# Patient Record
Sex: FEMALE | Race: WHITE
[De-identification: names, ages, dates, MRNs, and addresses within clinical notes are randomized per-mention and may not be internally consistent; named-entity substitution may affect disease eponyms.]

---

## 2018-08-13 ENCOUNTER — HOSPITAL ENCOUNTER (OUTPATIENT)
Dept: HOSPITAL 75 - PREOP | Age: 43
Discharge: HOME | End: 2018-08-13
Attending: OTOLARYNGOLOGY
Payer: MEDICAID

## 2018-08-13 VITALS — DIASTOLIC BLOOD PRESSURE: 75 MMHG | SYSTOLIC BLOOD PRESSURE: 137 MMHG

## 2018-08-13 VITALS — HEIGHT: 61 IN | WEIGHT: 239.13 LBS | BODY MASS INDEX: 45.15 KG/M2

## 2018-08-13 DIAGNOSIS — Z01.810: Primary | ICD-10-CM

## 2018-08-13 DIAGNOSIS — J33.9: ICD-10-CM

## 2018-08-13 DIAGNOSIS — J34.2: ICD-10-CM

## 2018-08-13 DIAGNOSIS — Z01.811: ICD-10-CM

## 2018-08-13 DIAGNOSIS — Z11.2: ICD-10-CM

## 2018-08-13 DIAGNOSIS — Z01.812: ICD-10-CM

## 2018-08-13 DIAGNOSIS — J34.3: ICD-10-CM

## 2018-08-13 DIAGNOSIS — J32.4: ICD-10-CM

## 2018-08-13 LAB
ALBUMIN SERPL-MCNC: 3.9 GM/DL (ref 3.2–4.5)
ALP SERPL-CCNC: 121 U/L (ref 40–136)
ALT SERPL-CCNC: 32 U/L (ref 0–55)
BASOPHILS # BLD AUTO: 0 10^3/UL (ref 0–0.1)
BASOPHILS NFR BLD AUTO: 1 % (ref 0–10)
BILIRUB SERPL-MCNC: 0.3 MG/DL (ref 0.1–1)
BUN/CREAT SERPL: 14
CALCIUM SERPL-MCNC: 9.8 MG/DL (ref 8.5–10.1)
CHLORIDE SERPL-SCNC: 99 MMOL/L (ref 98–107)
CO2 SERPL-SCNC: 15 MMOL/L (ref 21–32)
CREAT SERPL-MCNC: 0.69 MG/DL (ref 0.6–1.3)
EOSINOPHIL # BLD AUTO: 0.1 10^3/UL (ref 0–0.3)
EOSINOPHIL NFR BLD AUTO: 2 % (ref 0–10)
ERYTHROCYTE [DISTWIDTH] IN BLOOD BY AUTOMATED COUNT: 14 % (ref 10–14.5)
GFR SERPLBLD BASED ON 1.73 SQ M-ARVRAT: > 60 ML/MIN
GLUCOSE SERPL-MCNC: 319 MG/DL (ref 70–105)
HCT VFR BLD CALC: 42 % (ref 35–52)
HGB BLD-MCNC: 14 G/DL (ref 11.5–16)
LYMPHOCYTES # BLD AUTO: 2.8 X 10^3 (ref 1–4)
LYMPHOCYTES NFR BLD AUTO: 42 % (ref 12–44)
MANUAL DIFFERENTIAL PERFORMED BLD QL: NO
MCH RBC QN AUTO: 29 PG (ref 25–34)
MCHC RBC AUTO-ENTMCNC: 34 G/DL (ref 32–36)
MCV RBC AUTO: 86 FL (ref 80–99)
MONOCYTES # BLD AUTO: 0.4 X 10^3 (ref 0–1)
MONOCYTES NFR BLD AUTO: 5 % (ref 0–12)
NEUTROPHILS # BLD AUTO: 3.3 X 10^3 (ref 1.8–7.8)
NEUTROPHILS NFR BLD AUTO: 50 % (ref 42–75)
PLATELET # BLD: 235 10^3/UL (ref 130–400)
PMV BLD AUTO: 9.8 FL (ref 7.4–10.4)
POTASSIUM SERPL-SCNC: 4.3 MMOL/L (ref 3.6–5)
PROT SERPL-MCNC: 7.9 GM/DL (ref 6.4–8.2)
RBC # BLD AUTO: 4.84 10^6/UL (ref 4.35–5.85)
SODIUM SERPL-SCNC: 135 MMOL/L (ref 135–145)
WBC # BLD AUTO: 6.6 10^3/UL (ref 4.3–11)

## 2018-08-13 PROCEDURE — 93005 ELECTROCARDIOGRAM TRACING: CPT

## 2018-08-13 PROCEDURE — 87081 CULTURE SCREEN ONLY: CPT

## 2018-08-13 PROCEDURE — 85025 COMPLETE CBC W/AUTO DIFF WBC: CPT

## 2018-08-13 PROCEDURE — 36415 COLL VENOUS BLD VENIPUNCTURE: CPT

## 2018-08-13 PROCEDURE — 80053 COMPREHEN METABOLIC PANEL: CPT

## 2018-08-13 PROCEDURE — 71046 X-RAY EXAM CHEST 2 VIEWS: CPT

## 2018-08-13 NOTE — DIAGNOSTIC IMAGING REPORT
INDICATION: Preoperative evaluation prior to septoplasty.



TECHNIQUE: PA and lateral views of the chest are obtained.



COMPARISON: No previous study is available for comparison at this

time.



FINDINGS: Heart size and pulmonary vasculature are within normal

limits, and the lungs are clear, bilaterally.



IMPRESSION: Unremarkable chest.



Dictated by: 



  Dictated on workstation # [id number]

## 2018-08-16 ENCOUNTER — HOSPITAL ENCOUNTER (OUTPATIENT)
Dept: HOSPITAL 75 - SDC | Age: 43
Discharge: HOME | End: 2018-08-16
Attending: OTOLARYNGOLOGY
Payer: MEDICAID

## 2018-08-16 VITALS — BODY MASS INDEX: 45.15 KG/M2 | HEIGHT: 61 IN | WEIGHT: 239.13 LBS

## 2018-08-16 VITALS — SYSTOLIC BLOOD PRESSURE: 107 MMHG | DIASTOLIC BLOOD PRESSURE: 58 MMHG

## 2018-08-16 VITALS — DIASTOLIC BLOOD PRESSURE: 54 MMHG | SYSTOLIC BLOOD PRESSURE: 106 MMHG

## 2018-08-16 VITALS — SYSTOLIC BLOOD PRESSURE: 114 MMHG | DIASTOLIC BLOOD PRESSURE: 84 MMHG

## 2018-08-16 VITALS — DIASTOLIC BLOOD PRESSURE: 60 MMHG | SYSTOLIC BLOOD PRESSURE: 120 MMHG

## 2018-08-16 VITALS — DIASTOLIC BLOOD PRESSURE: 58 MMHG | SYSTOLIC BLOOD PRESSURE: 107 MMHG

## 2018-08-16 VITALS — SYSTOLIC BLOOD PRESSURE: 101 MMHG | DIASTOLIC BLOOD PRESSURE: 51 MMHG

## 2018-08-16 DIAGNOSIS — G43.909: ICD-10-CM

## 2018-08-16 DIAGNOSIS — E11.9: ICD-10-CM

## 2018-08-16 DIAGNOSIS — J32.0: Primary | ICD-10-CM

## 2018-08-16 DIAGNOSIS — J45.909: ICD-10-CM

## 2018-08-16 DIAGNOSIS — J33.0: ICD-10-CM

## 2018-08-16 DIAGNOSIS — J32.2: ICD-10-CM

## 2018-08-16 DIAGNOSIS — J32.3: ICD-10-CM

## 2018-08-16 DIAGNOSIS — K21.9: ICD-10-CM

## 2018-08-16 DIAGNOSIS — J34.2: ICD-10-CM

## 2018-08-16 DIAGNOSIS — Z79.4: ICD-10-CM

## 2018-08-16 DIAGNOSIS — Z87.891: ICD-10-CM

## 2018-08-16 DIAGNOSIS — E78.5: ICD-10-CM

## 2018-08-16 DIAGNOSIS — Z79.899: ICD-10-CM

## 2018-08-16 DIAGNOSIS — J34.3: ICD-10-CM

## 2018-08-16 PROCEDURE — 82962 GLUCOSE BLOOD TEST: CPT

## 2018-08-16 RX ADMIN — MORPHINE SULFATE PRN MG: 10 INJECTION, SOLUTION INTRAMUSCULAR; INTRAVENOUS at 10:08

## 2018-08-16 RX ADMIN — MORPHINE SULFATE PRN MG: 10 INJECTION, SOLUTION INTRAMUSCULAR; INTRAVENOUS at 10:15

## 2018-08-16 RX ADMIN — SODIUM CHLORIDE, SODIUM LACTATE, POTASSIUM CHLORIDE, AND CALCIUM CHLORIDE PRN MLS/HR: 600; 310; 30; 20 INJECTION, SOLUTION INTRAVENOUS at 10:30

## 2018-08-16 RX ADMIN — SODIUM CHLORIDE, SODIUM LACTATE, POTASSIUM CHLORIDE, AND CALCIUM CHLORIDE PRN MLS/HR: 600; 310; 30; 20 INJECTION, SOLUTION INTRAVENOUS at 08:14

## 2018-08-16 NOTE — PROGRESS NOTE-POST OPERATIVE
Post-Operative Progess Note


Surgeon (s)/Assistant (s)


Surgeon


KADY TREVIZO MD


Assistant


n/a





Pre-Operative Diagnosis


Bilat Chronic Sinusitis, Deviated nasal septum, Bilat Hyepr of Inf Turbs





Post-Operative Diagnosis


same





Post-Op Procedure Note


Date of Procedure:  Aug 16, 2018


Name of Procedure Performed:  


Bilat ESS, Bilat Reduction of INf Turbinates


Description & Findings


Description and Findings:





n/a


Anesthesia Type


get


Estimated Blood Loss


minimal


Packing


none.


Specimen(s) collected/removed


bilat chroinc sinus disease











KADY TREVIZO MD Aug 16, 2018 9:47 am

## 2018-08-16 NOTE — ANESTHESIA-GENERAL POST-OP
General


Patient Condition


Mental Status/LOC:  Same as Preop


Cardiovascular:  Satisfactory


Nausea/Vomiting:  Absent


Respiratory:  Satisfactory


Pain:  Controlled


Complications:  Absent





Post Op Complications


Complications


None





Follow Up Care/Instructions


Patient Instructions


None needed.





Anesthesia/Patient Condition


Patient Condition


Patient is doing well, no complaints, stable vital signs, no apparent adverse 

anesthesia problems.   


No complications reported per nursing.


D/C home per [facility] Criteria:  Yes











PENNIE CHRISTIE CRNA Aug 16, 2018 12:21

## 2018-08-16 NOTE — PROGRESS NOTE-PRE OPERATIVE
Pre-Operative Progress Note


H&P Reviewed


The H&P was reviewed, patient examined and no changes noted.


Date Seen by Provider:  Aug 16, 2018


Time Seen by Provider:  08:00


Date H&P Reviewed:  Aug 16, 2018


Time H&P Reviewed:  08:00


Pre-Operative Diagnosis:  Bilat Chronic Sinusitis, Deviated nasal septum, Bilat 

Hyepr of Inf Turbs











KADY TREVIZO MD Aug 16, 2018 8:21 am

## 2018-08-17 NOTE — XMS REPORT
[facility] Clinical Summary

 Created on: 2016



DanaKimberly

External Reference #: [id number]

: 1975

Sex: Female



Demographics







 Address  [address] [place]  [address]

 

 Home Phone  [phone]

 

 Preferred Language  English

 

 Marital Status  S

 

 Sabianist Affiliation  Unknown

 

 Race  Unknown

 

 Ethnic Group  Not  or 





Author







 Author  Admin, E

 

 Organization  [facility]

 

 Address  Unknown

 

 Phone  Unavailable







Allergies, Adverse Reactions, Alerts







 Allergy Name  Reaction Description  Start Date  Severity  Status  Provider

 

 No Known Allergies             DEMOND Kyle 







Conditions or Problems







 Problem Name  Problem Code  Onset Date  Status  Entry Date  Provider  Comment  
Standard Description  Annotate

 

 Asthma  493.90    Active    Jero Luz MD     Asthma, 
unspecified   (History of)

 

 Diabetes, Type 2  250.00    Active    Jero Luz MD   
  Diabetes mellitus without mention of complication, type II or unspecified type
, not stated as uncontrolled   

 

 FH Stroke  V17.1    Active    Jero Luz MD     Family 
history of stroke (cerebrovascular)   

 

 FH Diabetes  V18.0    Active    Jero Luz MD     
Family history of diabetes mellitus   

 

 Chronic tonsillitis  474.00    Active    Jero Luz MD
     Chronic tonsillitis   

 

 AFTERCARE FLW SURG TEETH ORL CAV&DIGESTV SYS NEC  V58.75    Active  
  Jero Luz MD     Aftercare following surgery of the teeth,oral 
cavity and digestive system, NEC   

 

 Irregular menses  626.4    Active    Stephanie Dove MD     
Irregular menstrual cycle   

 

 Stress incontinence  788.32    Active    Stephanie Dove MD 
    Stress incontinence, male   

 

 Hx of endometrial ablation  V45.89    Active    Stephanie Dove MD     Other postsurgical status   

 

 Family history of uterine cancer  V16.49    Active    Stephanie Dove MD     Family history of malignant neoplasm of other genital organ   







Medication List







 Medication  Instructions  Start Date  Stop Date  Generic Name  NDC  Status  
Provider  Patient Instruction









 SAVELLA 100 MG ORAL TABS  1 tablet twice daily       MILNACIPRAN HCL
  [id number]  Active  Stephanie Dove MD     Active

 

 AMITRIPTYLINE HCL 25 MG ORAL TABS  1.5 tablets once daily       
AMITRIPTYLINE HCL  [id number]  Active  Stephanie Dove MD     Active

 

 TIZANIDINE HCL 4 MG ORAL TABS  1/2 tablet 2 to 3x daily       
TIZANIDINE HCL  43552625033  Active  Stephanie Dove MD     Active

 

 PANTOPRAZOLE SODIUM 40 MG ORAL TBEC  1 tablet daily       
PANTOPRAZOLE SODIUM  97953997989  Active  Stephanie Dove MD     Active

 

 RANITIDINE  MG ORAL TABS  1 tablet twice daily       
RANITIDINE HCL  68015643165  Active  Stephanie Dove MD     Active

 

 CELECOXIB 200 MG ORAL CAPS  1 tablet twice daily       CELECOXIB  
63415408053  Active  Stephanie Dove MD     Active

 

 LEVO-T 25 MCG ORAL TABS  1 tablet daily       LEVOTHYROXINE SODIUM  
88309891543  Active  Stephanie Dove MD     Active

 

 B-12 1000 MCG ORAL CAPS  1 capsule daily       CYANOCOBALAMIN  
87797224861  Active  Stephanie Dove MD     Active

 

 INVOKANA 300 MG ORAL TABS  take 1 tablet by mouth daily before the first meal 
of the day       CANAGLIFLOZIN  93097729702  Active  Stephanie Dove MD
     Active

 

 FENOFIBRATE 150 MG ORAL CAPS  1 tablet daily       FENOFIBRATE  
23060673988  Active  Stephanie Dove MD     Active

 

 GLYBURIDE MICRONIZED 3 MG TABS  Take one by mouth daily    GLYBURIDE MICRONIZED  72492905715  No Longer Active  Stephanie Dove MD     
Active

 

 ALPRAZOLAM TABS  Take one by mouth at bedtime as needed    ALPRAZOLAM TABS  18212965141  No Longer Active  Stephanie Dove MD     Active

 

 ALBUTEROL SULFATE 0.083 % NEBU SOLN  one vial per nebulizer needed  
     ALBUTEROL SULFATE  26449023526  Active  Jero Luz MD     Active

 

 GABAPENTIN 300 MG CAPS  2 po q hs for nerve pain       GABAPENTIN  
54358502962  Active  Jero Luz MD     Active









 ALPRAZOLAM TABS  Take one by mouth at bedtime as needed    ALPRAZOLAM TABS     ALPRAZOLAM TABS  Inactive

 

 GLYBURIDE MICRONIZED 3 MG TABS  Take one by mouth daily    GLYBURIDE MICRONIZED 3 MG TABS  036964  GLYBURIDE MICRONIZED  Inactive







Diagnostic Results







 Date  Name  Value  Unit  Range  Description

 

 Chart Maintenance: Outside labs entered on flowsheet - Chemistry 

 

   sodium, serum  133  mmol/L      

 

   potassium, serum  3.8  mmol/L      

 

   blood glucose  275  mg/dL      

 

   creatinine, serum  0.64  mg/dL      

 

   aspartate aminotransferase (SGOT), serum  34  U/L      

 

   alanine aminotransferase (SGPT), serum  53  U/L      

 

   alkaline phosphatase, serum  129  U/L      

 

   protein, total urine random  2+  mg/dL      

 

 Chart Maintenance: Outside labs entered on flowsheet - Hematology 

 

   leukocyte count, blood  10.7  10*3/mm3      

 

   hemoglobin, blood  15.9  g/dL      

 

   platelet count  308  10*3/mm3      

 

 Lab Report: Chlamydia/GC APTIMA/62417 - Lab 

 

   chlamydia DNA probe  NOT DETECTED     NOT DETECTED   

 

 Lab Report: Chlamydia/GC APTIMA/86106 - Microbiology 

 

   Neisseria gonorrhoeae DNA probe  NOT DETECTED     NOT DETECTED   







Encounters







 Code  Encounter  Date  Provider  Facility

 

 CPT-11839  Level 3 Est. Patient   15:22:48 CST  Jero Luz MD  
[facility]







Procedures







 Code  Procedure Name  Date  Entry Date  Standard Description

 

 CPT-74416  Endometrial bx wo cervical dil   15:31:33 CDT  
   

 

 CPT-OV  Office Visit   14:46:14 CDT     

 

 CPT-18436  Postop F/U Visit   06:18:39 CST

## 2018-08-17 NOTE — XMS REPORT
[facility] Clinical Summary

 Created on: 2016



Dana Nicogaurang TIAN

External Reference #: [id number]

: 1975

Sex: Female



Demographics







 Address  [address] [place]  [address]

 

 Home Phone  [phone]

 

 Preferred Language  English

 

 Marital Status  S

 

 Taoism Affiliation  Unknown

 

 Race  Unknown

 

 Ethnic Group  Not  or 





Author







 Author  Admin, E

 

 Organization  [facility]

 

 Address  Unknown

 

 Phone  Unavailable







Allergies, Adverse Reactions, Alerts







 Allergy Name  Reaction Description  Start Date  Severity  Status  Provider

 

 No Known Allergies             Ching Vee







Conditions or Problems







 Problem Name  Problem Code  Onset Date  Status  Entry Date  Provider  Comment  
Standard Description  Annotate

 

 Asthma  493.90    Active    Jero Luz MD     Asthma, 
unspecified   (History of)

 

 Diabetes, Type 2  250.00    Active    Jero Luz MD   
  Diabetes mellitus without mention of complication, type II or unspecified type
, not stated as uncontrolled   

 

 FH Stroke  V17.1    Active    Jero Luz MD     Family 
history of stroke (cerebrovascular)   

 

 FH Diabetes  V18.0    Active    Jero Luz MD     
Family history of diabetes mellitus   

 

 Chronic tonsillitis  474.00    Active    Jero Luz MD
     Chronic tonsillitis   

 

 AFTERCARE FLW SURG TEETH ORL CAV&DIGESTV SYS NEC  V58.75    Active  
  Jero Luz MD     Aftercare following surgery of the teeth,oral 
cavity and digestive system, NEC   

 

 Irregular menses  626.4    Active    Stephanie Dove MD     
Irregular menstrual cycle   

 

 Stress incontinence  788.32    Active    Stephanie Dove MD 
    Stress incontinence, male   

 

 Hx of endometrial ablation  V45.89    Active    Stephanie Dove MD     Other postsurgical status   

 

 Family history of uterine cancer  V16.49    Active    Stephanie Dove MD     Family history of malignant neoplasm of other genital organ   







Medication List







 Medication  Instructions  Start Date  Stop Date  Generic Name  NDC  Status  
Provider  Patient Instruction









 SAVELLA 100 MG ORAL TABS  1 tablet twice daily       MILNACIPRAN HCL
  [id number]  Active  Stephanie Dove MD     Active

 

 AMITRIPTYLINE HCL 25 MG ORAL TABS  1.5 tablets once daily       
AMITRIPTYLINE HCL  16780628185  Active  Stephanie Dove MD     Active

 

 TIZANIDINE HCL 4 MG ORAL TABS  1/2 tablet 2 to 3x daily       
TIZANIDINE HCL  84364583195  Active  Stephanie Dove MD     Active

 

 PANTOPRAZOLE SODIUM 40 MG ORAL TBEC  1 tablet daily       
PANTOPRAZOLE SODIUM  19411346274  Active  Stephanie Dove MD     Active

 

 RANITIDINE  MG ORAL TABS  1 tablet twice daily       
RANITIDINE HCL  28131095773  Active  Stephanie Dove MD     Active

 

 CELECOXIB 200 MG ORAL CAPS  1 tablet twice daily       CELECOXIB  
49294768325  Active  Stephanie Dove MD     Active

 

 LEVO-T 25 MCG ORAL TABS  1 tablet daily       LEVOTHYROXINE SODIUM  
29381784857  Active  Stephanie Dove MD     Active

 

 B-12 1000 MCG ORAL CAPS  1 capsule daily       CYANOCOBALAMIN  
97036229865  Active  Stephanie Dove MD     Active

 

 INVOKANA 300 MG ORAL TABS  take 1 tablet by mouth daily before the first meal 
of the day       CANAGLIFLOZIN  36645602456  Active  Stephanie Dove MD
     Active

 

 FENOFIBRATE 150 MG ORAL CAPS  1 tablet daily       FENOFIBRATE  
79706318228  Active  Stephanie Dove MD     Active

 

 GLYBURIDE MICRONIZED 3 MG TABS  Take one by mouth daily    GLYBURIDE MICRONIZED  79771594695  No Longer Active  Stephanie Dove MD     
Active

 

 ALPRAZOLAM TABS  Take one by mouth at bedtime as needed    ALPRAZOLAM TABS  34681047819  No Longer Active  Stephanie Dove MD     Active

 

 ALBUTEROL SULFATE 0.083 % NEBU SOLN  one vial per nebulizer needed  
     ALBUTEROL SULFATE  23113424095  Active  Jero Luz MD     Active

 

 GABAPENTIN 300 MG CAPS  2 po q hs for nerve pain       GABAPENTIN  
82935594493  Active  Jero Luz MD     Active









 ALPRAZOLAM TABS  Take one by mouth at bedtime as needed    ALPRAZOLAM TABS     ALPRAZOLAM TABS  Inactive

 

 GLYBURIDE MICRONIZED 3 MG TABS  Take one by mouth daily    GLYBURIDE MICRONIZED 3 MG TABS  117961  GLYBURIDE MICRONIZED  Inactive







Vital Signs







 Date  Name  Value  Unit  Range  Description

 

   blood pressure, diastolic - 8462-4  80  mm[Hg]     BP graham

 

   blood pressure, systolic - 8480-6  133  mm[Hg]     BP sys

 

   pulse rate E&M - 8867-4  104  /min     Heart rate

 

   temperature E&M  98.0  [degF]     Body temperature

 

   weight E&M - 3141-9  223  [lb_av]     Weight Measured







Diagnostic Results







 Date  Name  Value  Unit  Range  Description

 

 Chart Maintenance: Outside labs entered on flowsheet - Chemistry 

 

   sodium, serum  133  mmol/L      

 

   potassium, serum  3.8  mmol/L      

 

   blood glucose  275  mg/dL      

 

   creatinine, serum  0.64  mg/dL      

 

   aspartate aminotransferase (SGOT), serum  34  U/L      

 

   alanine aminotransferase (SGPT), serum  53  U/L      

 

   alkaline phosphatase, serum  129  U/L      

 

   protein, total urine random  2+  mg/dL      

 

 Chart Maintenance: Outside labs entered on flowsheet - Hematology 

 

   leukocyte count, blood  10.7  10*3/mm3      

 

   hemoglobin, blood  15.9  g/dL      

 

   platelet count  308  10*3/mm3      

 

 Lab Report: Chlamydia/GC APTIMA/91071 - Lab 

 

   chlamydia DNA probe  NOT DETECTED     NOT DETECTED   

 

 Lab Report: Chlamydia/GC APTIMA/47043 - Microbiology 

 

   Neisseria gonorrhoeae DNA probe  NOT DETECTED     NOT DETECTED   







Encounters







 Code  Encounter  Date  Provider  Facility

 

 CPT-83576  Level 3 Est. Patient   15:22:48 CST  Jero Luz MD  
[facility]







Procedures







 Code  Procedure Name  Date  Entry Date  Standard Description

 

 CPT-13836  Endometrial bx wo cervical dil   15:31:33 CDT  
   

 

 CPT-OV  Office Visit   14:46:14 CDT     

 

 CPT-54269  Postop F/U Visit   06:18:39 CST

## 2018-08-17 NOTE — XMS REPORT
Continuity of Care Document

 Created on: 2018



LONG ROMO

External Reference #: [id number]

: 1975

Sex: Female



Demographics







 Home Phone  [phone] x

 

 Preferred Language  Unknown

 

 Marital Status  Unknown

 

 Tenriism Affiliation  Unknown

 

 Race  Unknown

 

 Ethnic Group  Unknown





Author







 Author  [facility]

 

 Organization  [facility]

 

 Address  Unknown

 

 Phone  Unavailable



              



Allergies

      





 Active            Description            Code            Type            
Severity            Reaction            Onset            Reported/Identified   
         Relationship to Patient            Clinical Status        

 

 Yes            Anectine            73213            Drug Allergy            
Unknown            Family hx of severe rx to Anecti                            
                                

 

 Yes            No Known Drug Allergies            46097180            Drug 
Allergy            N/A            N/A                                          
         Confirmed but inactive        

 

 Yes            succinylcholine            5590            Drug Allergy        
    N/A            N/A                                                   
Confirmed or Verified        



                      



Medications

      



There is no data.                  



Problems

      





 Date Dx Coded            Attending            Type            Code            
Diagnosis            Diagnosed By        

 

 2014            WIN BLOOD MD                         724.2         
   LUMBAGO/ LOW BACK PAIN                     



                  



Procedures

      





 Code            Description            Performed By            Performed On   
     

 

             79463                                  URINE DRUG SCREEN  (IN-HOUSE
)                                   2014        

 

             10373                                  ROUTINE VENIPUNCTURE       
                            2016        

 

             81832                                  COMPREHEN METABOLIC PANEL  
                                 2016        

 

             98234                                  URINALYSIS, AUTO W/SCOPE   
                                2016        

 

             94528                                  COMPLETE CBC, AUTOMATED    
                               2016        

 

             98472                                  RBC ANTIBODY SCREEN        
                           2016        

 

             05777                                  BLOOD TYPING, ABO          
                         2016        

 

             69943                                  BLOOD TYPING, RH (D)       
                            2016        

 

             04653                                  SPECIAL SUPPLIES           
                        2016        

 

                                               INJ, BUPIVICAINE LIPOSOME  
                                 2016        

 

                                               INJ, PROPOFOL, 10 MG       
                            2016        



                                      



Results

      





 Test            Result            Range        









 UA - 16 00:00         









 PH            6.5             4.5-8.0        

 

 SG            1.025             1.003-1.035        

 

 UABILI            NEGATIVE                      

 

 UABLD            NEGATIVE                      

 

 UACOLOR            YEL                      

 

 UAGLU            3+                      

 

 UAKET            NEGATIVE                      

 

 UALEUK            NEGATIVE                      

 

 UANIT            NEGATIVE                      

 

 UAURO            0.2             0-0.2        

 

 UCX            NO                      

 

 CLARITY            SLTCLDY                      

 

 PROTEIN            2+                      

 

 UA WBC            NOWBC                      

 

 UA RBC            R05                      

 

 SQUAMOUS EPITHELIAL CELLS            3+                      

 

 BACTERIA            OCC                      

 

 YEAST            3+                      









 CBC - 16 00:00         









 HCT            47.9 %            36.9-47.0        

 

 HGB            15.9 G/DL            12.0-16.0        

 

 MCH            29.9 PG            27-31        

 

 MCHC            33.2 G/DL            33-37        

 

 MCV            90.2 FL            81-99        

 

 MPV            9.6 FL            7.3-10.4        

 

 PLT            308 10^3u            130-400        

 

 RBC            5.3 10^6u            4.2-5.4        

 

 RDW            13.3 %            11.5-15.5        

 

 WBC            10.7 10^3u            4.8-10.8        









 CMP - 16 00:00         









 ALB            4.3 G/DL            3.5-5        

 

 ALP            129 IU/L            25-72        

 

 ALT            53 IU/L            12-65        

 

 AST            34 IU/L            10-42        

 

 BCR            15.6             10-20        

 

 BUN            10 MG/DL            7-18        

 

 CA            8.9 MG/DL            8.4-10.2        

 

 CL            95 MEQ/L                    

 

 CO2            27.4 MEQ/L            22-28        

 

 CREA            0.64 MG/DL            0.6-1.0        

 

 EGFR            104 eGFR            >=60        

 

 GLU            275 MG/DL                    

 

 K            3.8 MEQ/L            3.5-5.1        

 

 NA            133 MEQ/L            134-145        

 

 OSMSC            275.2 MOSML            280-300        

 

 TBIL            0.3 MG/DL            0.1-1.0        

 

 TP            7.7 G/DL            6.0-8.3        

 

 Albumin/Globulin Ratio            1.3             0-8        

 

 Anion Gap            10.6             8-16        









 TYPE AND SCREEN - 16 00:00         









 ABO            O                      

 

 ABSCRN            N             Negative        

 

 RH            P                      









 UCG GROUP - 16 00:00         









 UCG            N             Negative        









 HA1C - 16 00:00         









 HA1C            11.1 %            4.5-6.2        









 TSH - 16 00:00         









 TSH            1.88 UIUML            0.36-3.74        









 MICROALBUMIN - 16 00:00         









 MALB            758.9 MG/L                     









 [facility] - 16 00:00         









 ALB            3.2 G/DL            3.5-5        

 

 ALP            108 IU/L            25-72        

 

 ALT            47 IU/L            12-65        

 

 AST            22 IU/L            10-42        

 

 BCR            20.3             10-20        

 

 BUN            13 MG/DL            7-18        

 

 CA            8.6 MG/DL            8.4-10.2        

 

 CL            100 MEQ/L                    

 

 CO2            28.2 MEQ/L            22-28        

 

 CREA            0.64 MG/DL            0.6-1.0        

 

 EGFR            104 eGFR            >=60        

 

 GLU            289 MG/DL                    

 

 K            4.3 MEQ/L            3.5-5.1        

 

 NA            137 MEQ/L            134-145        

 

 OSMSC            284.5 MOSML            280-300        

 

 TBIL            0.2 MG/DL            0.1-1.0        

 

 TP            6.2 G/DL            6.0-8.3        

 

 Albumin/Globulin Ratio            1.1             0-8        

 

 Anion Gap            8.8             8-16        









 CBC - 16 00:00         









 HCT            40.2 %            36.9-47.0        

 

 HGB            12.9 G/DL            12.0-16.0        

 

 MCH            29.5 PG            27-31        

 

 MCHC            32.1 G/DL            33-37        

 

 MCV            91.8 FL            81-99        

 

 MPV            9.7 FL            7.3-10.4        

 

 PLT            273 10^3u            130-400        

 

 RBC            4.4 10^6u            4.2-5.4        

 

 RDW            12.8 %            11.5-15.5        

 

 WBC            12.1 10^3u            4.8-10.8        



                                  



Encounters

      





 ACCT No.            Visit Date/Time            Discharge            Status    
        Pt. Type            Provider            Facility            Loc./Unit  
          Complaint        

 

 [id number]            2016 06:29:00            2016 12:45:00         
   DIS            Outpatient            ORAL OTERO            [facility]            OBS                     

 

 [id number]            2015 00:00:00                                   
   Document Registration                                                       
     

 

 [id number]            2018 10:15:03                         [facility]            
Unknown                                                            

 

 [id number]            2014 14:50:00            2014 23:59:59          
  CLS            Outpatient            SINGER GANNON, WIN TIAN

## 2018-08-17 NOTE — XMS REPORT
[facility] Clinical Summary

 Created on: 2016



Dana Nicogaurang TIAN

External Reference #: [id number]

: 1975

Sex: Female



Demographics







 Address  [address] [place]  [address]

 

 Home Phone  [phone]

 

 Preferred Language  English

 

 Marital Status  S

 

 Sikh Affiliation  Unknown

 

 Race  Unknown

 

 Ethnic Group  Not  or 





Author







 Author  Admin, E

 

 Organization  [facility]

 

 Address  Unknown

 

 Phone  Unavailable







Allergies, Adverse Reactions, Alerts







 Allergy Name  Reaction Description  Start Date  Severity  Status  Provider

 

 No Known Allergies             Ching Vee







Conditions or Problems







 Problem Name  Problem Code  Onset Date  Status  Entry Date  Provider  Comment  
Standard Description  Annotate

 

 Asthma  493.90    Active    Jero Luz MD     Asthma, 
unspecified   (History of)

 

 Diabetes, Type 2  250.00    Active    Jero Luz MD   
  Diabetes mellitus without mention of complication, type II or unspecified type
, not stated as uncontrolled   

 

 FH Stroke  V17.1    Active    Jero Luz MD     Family 
history of stroke (cerebrovascular)   

 

 FH Diabetes  V18.0    Active    Jero Luz MD     
Family history of diabetes mellitus   

 

 Chronic tonsillitis  474.00    Active    Jero Luz MD
     Chronic tonsillitis   

 

 AFTERCARE FLW SURG TEETH ORL CAV&DIGESTV SYS NEC  V58.75    Active  
  Jero Luz MD     Aftercare following surgery of the teeth,oral 
cavity and digestive system, NEC   

 

 Irregular menses  626.4    Resolved    Stephanie Dove MD   
  Irregular menstrual cycle   

 

 Stress incontinence  788.32    Active    Stephanie Dove MD 
    Stress incontinence, male   

 

 Hx of endometrial ablation  V45.89    Resolved    Stephanie Dove MD     Other postsurgical status   

 

 Family history of uterine cancer  V16.49    Active    Stephanie Dove MD     Family history of malignant neoplasm of other genital organ   

 

 Post-op care  V67.00    Active    Stephanie Dove MD     
Follow-up examination following surgery, unspecified   









 Irregular menses  ICD-626.4    Inactive  Stephanie Dove MD     

 

 Hx of endometrial ablation  ICD-V45.89    Inactive  Stephanie Dove MD     







Medication List







 Medication  Instructions  Start Date  Stop Date  Generic Name  NDC  Status  
Provider  Patient Instruction









 LANTUS 100 UNIT/ML SC SOLN  15units at night       INSULIN GLARGINE  
02374261433  Active  Stephanie Dove MD     Active

 

 NOVOLOG 100 UNIT/ML SC SOLN  sliding scale at meals       INSULIN 
ASPART  02357570599  Active  Stephanie Dove MD     Active

 

 GABAPENTIN 300 MG CAPS  2 po q hs for nerve pain    
GABAPENTIN  92715686196  No Longer Active  Stephanie Dove MD     Active

 

 INVOKANA 300 MG ORAL TABS  take 1 tablet by mouth daily before the first meal 
of the day    CANAGLIFLOZIN  70762133630  No Longer 
Active  Stephanie Dove MD     Active

 

 SAVELLA 100 MG ORAL TABS  1 tablet twice daily       MILNACIPRAN HCL
  21630624544  Active  Stephanie Dove MD     Active

 

 AMITRIPTYLINE HCL 25 MG ORAL TABS  1.5 tablets once daily       
AMITRIPTYLINE HCL  20824086702  Active  Stephanie Dove MD     Active

 

 TIZANIDINE HCL 4 MG ORAL TABS  1/2 tablet 2 to 3x daily       
TIZANIDINE HCL  61499038201  Active  Stephanie Dove MD     Active

 

 PANTOPRAZOLE SODIUM 40 MG ORAL TBEC  1 tablet daily       
PANTOPRAZOLE SODIUM  64637446219  Active  Stephanie Dove MD     Active

 

 RANITIDINE  MG ORAL TABS  1 tablet twice daily       
RANITIDINE HCL  26054906904  Active  Stephanie Dove MD     Active

 

 CELECOXIB 200 MG ORAL CAPS  1 tablet twice daily       CELECOXIB  
60560526703  Active  Stephanie Dove MD     Active

 

 LEVO-T 25 MCG ORAL TABS  1 tablet daily       LEVOTHYROXINE SODIUM  
19994449909  Active  Stephanie Dove MD     Active

 

 B-12 1000 MCG ORAL CAPS  1 capsule daily       CYANOCOBALAMIN  
28357119986  Active  Stephanie Dove MD     Active

 

 FENOFIBRATE 150 MG ORAL CAPS  1 tablet daily       FENOFIBRATE  
82381375788  Active  Stephanie Dove MD     Active

 

 GLYBURIDE MICRONIZED 3 MG TABS  Take one by mouth daily    GLYBURIDE MICRONIZED  84939525316  No Longer Active  Stephanie Dove MD     
Active

 

 ALPRAZOLAM TABS  Take one by mouth at bedtime as needed    ALPRAZOLAM TABS  25979477270  No Longer Active  Stephanie Dove MD     Active

 

 ALBUTEROL SULFATE 0.083 % NEBU SOLN  one vial per nebulizer needed  
     ALBUTEROL SULFATE  74582610332  Active  Jero Luz MD     Active









 ALPRAZOLAM TABS  Take one by mouth at bedtime as needed    ALPRAZOLAM TABS     ALPRAZOLAM TABS  Inactive

 

 GLYBURIDE MICRONIZED 3 MG TABS  Take one by mouth daily    GLYBURIDE MICRONIZED 3 MG TABS  152899  GLYBURIDE MICRONIZED  Inactive

 

 INVOKANA 300 MG ORAL TABS  take 1 tablet by mouth daily before the first meal 
of the day    INVOKANA 300 MG ORAL TABS     CANAGLIFLOZIN
  Inactive

 

 GABAPENTIN 300 MG CAPS  2 po q hs for nerve pain    
GABAPENTIN 300 MG CAPS  602775  GABAPENTIN  Inactive







Vital Signs







 Date  Name  Value  Unit  Range  Description

 

   blood pressure, diastolic - 8462-4  70  mm[Hg]     BP graham

 

   blood pressure, systolic - 8480-6  134  mm[Hg]     BP sys

 

   pulse rate E&M - 8867-4  95  /min     Heart rate

 

   temperature E&M  98.0  [degF]     Body temperature

 

   weight E&M - 3141-9  218  [lb_av]     Weight Measured

 

   blood pressure, diastolic - 8462-4  80  mm[Hg]     BP graham

 

   blood pressure, systolic - 8480-6  133  mm[Hg]     BP sys

 

   pulse rate E&M - 8867-4  104  /min     Heart rate

 

   temperature E&M  98.0  [degF]     Body temperature

 

   weight E&M - 3141-9  223  [lb_av]     Weight Measured







Diagnostic Results







 Date  Name  Value  Unit  Range  Description

 

 Chart Maintenance: Outside labs entered on flowsheet - Chemistry 

 

   sodium, serum  133  mmol/L      

 

   potassium, serum  3.8  mmol/L      

 

   blood glucose  275  mg/dL      

 

   creatinine, serum  0.64  mg/dL      

 

   aspartate aminotransferase (SGOT), serum  34  U/L      

 

   alanine aminotransferase (SGPT), serum  53  U/L      

 

   alkaline phosphatase, serum  129  U/L      

 

   protein, total urine random  2+  mg/dL      

 

 Chart Maintenance: Outside labs entered on flowsheet - Hematology 

 

   leukocyte count, blood  10.7  10*3/mm3      

 

   hemoglobin, blood  15.9  g/dL      

 

   platelet count  308  10*3/mm3      

 

 Lab Report: Chlamydia/GC APTIMA/91429 - Lab 

 

   chlamydia DNA probe  NOT DETECTED     NOT DETECTED   

 

 Lab Report: Chlamydia/GC APTIMA/25526 - Microbiology 

 

   Neisseria gonorrhoeae DNA probe  NOT DETECTED     NOT DETECTED   







Encounters







 Code  Encounter  Date  Provider  Facility

 

 CPT-14251  Level 3 Est. Patient   15:22:48 CST  Jero Luz MD  
[facility]







Procedures







 Code  Procedure Name  Date  Entry Date  Standard Description

 

 CPT-OV  Office Visit   15:52:26 CDT     

 

 CPT-91768  Endometrial bx wo cervical dil   15:31:33 CDT  
   

 

 CPT-OV  Office Visit   14:46:14 CDT     

 

 CPT-48381  Postop F/U Visit   06:18:39 CST

## 2018-08-17 NOTE — XMS REPORT
[facility] [employer] Clinical Summary

 Created on: 2016



Dana Nicogaurang TIAN

External Reference #: [id number]

: 1975

Sex: Female



Demographics







 Address  [address] [place]  [address]

 

 Home Phone  [phone]

 

 Preferred Language  English

 

 Marital Status  S

 

 Mu-ism Affiliation  Unknown

 

 Race  Unknown

 

 Ethnic Group  Not  or 





Author







 Author  Admin, E

 

 Organization  [facility]

 

 Address  Unknown

 

 Phone  Unavailable







Allergies, Adverse Reactions, Alerts







 Allergy Name  Reaction Description  Start Date  Severity  Status  Provider

 

 No Known Allergies             Ching Vee







Conditions or Problems







 Problem Name  Problem Code  Onset Date  Status  Entry Date  Provider  Comment  
Standard Description  Annotate

 

 Asthma  493.90    Active    Jero Luz MD     Asthma, 
unspecified   (History of)

 

 Diabetes, Type 2  250.00    Active    Jero Luz MD   
  Diabetes mellitus without mention of complication, type II or unspecified type
, not stated as uncontrolled   

 

 FH Stroke  V17.1    Active    Jero Luz MD     Family 
history of stroke (cerebrovascular)   

 

 FH Diabetes  V18.0    Active    Jero Luz MD     
Family history of diabetes mellitus   

 

 Chronic tonsillitis  474.00    Active    Jero Luz MD
     Chronic tonsillitis   

 

 AFTERCARE FLW SURG TEETH ORL CAV&DIGESTV SYS NEC  V58.75    Active  
  Jero Luz MD     Aftercare following surgery of the teeth,oral 
cavity and digestive system, NEC   

 

 Irregular menses  626.4    Resolved    Stephanie Dove MD   
  Irregular menstrual cycle   

 

 Stress incontinence  788.32    Active    Stephanie Dove MD 
    Stress incontinence, male   

 

 Hx of endometrial ablation  V45.89    Resolved    Stephanie Dove MD     Other postsurgical status   

 

 Family history of uterine cancer  V16.49    Active    Stephanie Dove MD     Family history of malignant neoplasm of other genital organ   

 

 Post-op care  V67.00    Active    Stephanie Dove MD     
Follow-up examination following surgery, unspecified   









 Irregular menses  ICD-626.4    Inactive  Stephanie Dove MD     

 

 Hx of endometrial ablation  ICD-V45.89    Inactive  Stephanie Dove MD     







Medication List







 Medication  Instructions  Start Date  Stop Date  Generic Name  NDC  Status  
Provider  Patient Instruction









 ESTRADIOL 1 MG ORAL TABS  one tab PO daily       ESTRADIOL  
[id number]  Active  Stephanie Dove MD     Active

 

 LANTUS 100 UNIT/ML SC SOLN  15units at night       INSULIN GLARGINE  
38061191485  Active  Stephanie Dove MD     Active

 

 NOVOLOG 100 UNIT/ML SC SOLN  sliding scale at meals       INSULIN 
ASPART  72038401561  Active  Stephanie Dove MD     Active

 

 GABAPENTIN 300 MG CAPS  2 po q hs for nerve pain    
GABAPENTIN  17447326966  No Longer Active  Stephanie Dove MD     Active

 

 INVOKANA 300 MG ORAL TABS  take 1 tablet by mouth daily before the first meal 
of the day    CANAGLIFLOZIN  87925462820  No Longer 
Active  Stephanie Dove MD     Active

 

 SAVELLA 100 MG ORAL TABS  1 tablet twice daily       MILNACIPRAN HCL
  32283926098  Active  Stephanie Dove MD     Active

 

 AMITRIPTYLINE HCL 25 MG ORAL TABS  1.5 tablets once daily       
AMITRIPTYLINE HCL  32154038982  Active  Stephanie Dove MD     Active

 

 TIZANIDINE HCL 4 MG ORAL TABS  1/2 tablet 2 to 3x daily       
TIZANIDINE HCL  82954425113  Active  Stephanie Dove MD     Active

 

 PANTOPRAZOLE SODIUM 40 MG ORAL TBEC  1 tablet daily       
PANTOPRAZOLE SODIUM  36187000776  Active  Stephanie Dove MD     Active

 

 RANITIDINE  MG ORAL TABS  1 tablet twice daily       
RANITIDINE HCL  78542266961  Active  Stephanie Dove MD     Active

 

 CELECOXIB 200 MG ORAL CAPS  1 tablet twice daily       CELECOXIB  
60773315226  Active  Stephanie Dove MD     Active

 

 LEVO-T 25 MCG ORAL TABS  1 tablet daily       LEVOTHYROXINE SODIUM  
79693811598  Active  Stephanie Dove MD     Active

 

 B-12 1000 MCG ORAL CAPS  1 capsule daily       CYANOCOBALAMIN  
90866069520  Active  Stephanie Dove MD     Active

 

 FENOFIBRATE 150 MG ORAL CAPS  1 tablet daily       FENOFIBRATE  
28521770980  Active  Stephanie Dove MD     Active

 

 GLYBURIDE MICRONIZED 3 MG TABS  Take one by mouth daily    GLYBURIDE MICRONIZED  72271662371  No Longer Active  Stephanie Dove MD     
Active

 

 ALPRAZOLAM TABS  Take one by mouth at bedtime as needed    ALPRAZOLAM TABS  14816722591  No Longer Active  Stephanie Dove MD     Active

 

 ALBUTEROL SULFATE 0.083 % NEBU SOLN  one vial per nebulizer needed  
     ALBUTEROL SULFATE  59052834410  Active  Jero Luz MD     Active









 ALPRAZOLAM TABS  Take one by mouth at bedtime as needed    ALPRAZOLAM TABS     ALPRAZOLAM TABS  Inactive

 

 GLYBURIDE MICRONIZED 3 MG TABS  Take one by mouth daily    GLYBURIDE MICRONIZED 3 MG TABS  199584  GLYBURIDE MICRONIZED  Inactive

 

 INVOKANA 300 MG ORAL TABS  take 1 tablet by mouth daily before the first meal 
of the day    INVOKANA 300 MG ORAL TABS     CANAGLIFLOZIN
  Inactive

 

 GABAPENTIN 300 MG CAPS  2 po q hs for nerve pain    
GABAPENTIN 300 MG CAPS  172277  GABAPENTIN  Inactive







Vital Signs







 Date  Name  Value  Unit  Range  Description

 

   blood pressure, diastolic - 8462-4  70  mm[Hg]     BP graham

 

   blood pressure, systolic - 8480-6  134  mm[Hg]     BP sys

 

   pulse rate E&M - 8867-4  95  /min     Heart rate

 

   temperature E&M  98.0  [degF]     Body temperature

 

   weight E&M - 3141-9  218  [lb_av]     Weight Measured

 

   blood pressure, diastolic - 8462-4  80  mm[Hg]     BP graham

 

   blood pressure, systolic - 8480-6  133  mm[Hg]     BP sys

 

   pulse rate E&M - 8867-4  104  /min     Heart rate

 

   temperature E&M  98.0  [degF]     Body temperature

 

   weight E&M - 3141-9  223  [lb_av]     Weight Measured







Diagnostic Results







 Date  Name  Value  Unit  Range  Description

 

 Chart Maintenance: Outside labs entered on flowsheet - Chemistry 

 

   sodium, serum  133  mmol/L      

 

   potassium, serum  3.8  mmol/L      

 

   blood glucose  275  mg/dL      

 

   creatinine, serum  0.64  mg/dL      

 

   aspartate aminotransferase (SGOT), serum  34  U/L      

 

   alanine aminotransferase (SGPT), serum  53  U/L      

 

   alkaline phosphatase, serum  129  U/L      

 

   protein, total urine random  2+  mg/dL      

 

 Chart Maintenance: Outside labs entered on flowsheet - Hematology 

 

   leukocyte count, blood  10.7  10*3/mm3      

 

   hemoglobin, blood  15.9  g/dL      

 

   platelet count  308  10*3/mm3      

 

 Lab Report: Chlamydia/GC APTIMA/85961 - Lab 

 

   chlamydia DNA probe  NOT DETECTED     NOT DETECTED   

 

 Lab Report: Chlamydia/GC APTIMA/76928 - Microbiology 

 

   Neisseria gonorrhoeae DNA probe  NOT DETECTED     NOT DETECTED   







Encounters







 Code  Encounter  Date  Provider  Facility

 

 CPT-34687  Level 3 Est. Patient   15:22:48 CST  Jero Luz MD  
[facility]







Procedures







 Code  Procedure Name  Date  Entry Date  Standard Description

 

 CPT-OV  Office Visit   15:52:26 CDT     

 

 CPT-66910  Endometrial bx wo cervical dil   15:31:33 CDT  
   

 

 CPT-OV  Office Visit   14:46:14 CDT     

 

 CPT-68721  Postop F/U Visit   06:18:39 CST

## 2018-08-17 NOTE — XMS REPORT
[facility] [employer] Clinical Summary

 Created on: 2016



DanaKimberly

External Reference #: [id number]

: 1975

Sex: Female



Demographics







 Address  [address] [place]  [address]

 

 Home Phone  [phone]

 

 Preferred Language  English

 

 Marital Status  S

 

 Zoroastrian Affiliation  Unknown

 

 Race  Unknown

 

 Ethnic Group  Not  or 





Author







 Author  Admin, E

 

 Organization  [email or web address]

 

 Address  Unknown

 

 Phone  Unavailable







Allergies, Adverse Reactions, Alerts







 Allergy Name  Reaction Description  Start Date  Severity  Status  Provider

 

 No Known Allergies             Ching Vee







Conditions or Problems







 Problem Name  Problem Code  Onset Date  Status  Entry Date  Provider  Comment  
Standard Description  Annotate

 

 Asthma  493.90    Active    Jero Luz MD     Asthma, 
unspecified   (History of)

 

 Diabetes, Type 2  250.00    Active    Jero Luz MD   
  Diabetes mellitus without mention of complication, type II or unspecified type
, not stated as uncontrolled   

 

 FH Stroke  V17.1    Active    Jero Luz MD     Family 
history of stroke (cerebrovascular)   

 

 FH Diabetes  V18.0    Active    Jero Luz MD     
Family history of diabetes mellitus   

 

 Chronic tonsillitis  474.00    Active    Jero Luz MD
     Chronic tonsillitis   

 

 AFTERCARE FLW SURG TEETH ORL CAV&DIGESTV SYS NEC  V58.75    Active  
  Jero Luz MD     Aftercare following surgery of the teeth,oral 
cavity and digestive system, NEC   

 

 Irregular menses  626.4    Active    Stephanie Dove MD     
Irregular menstrual cycle   

 

 Stress incontinence  788.32    Active    Stephanie Dove MD 
    Stress incontinence, male   

 

 Hx of endometrial ablation  V45.89    Active    Stephanie Dove MD     Other postsurgical status   

 

 Family history of uterine cancer  V16.49    Active    Stephanie Dove MD     Family history of malignant neoplasm of other genital organ   







Medication List







 Medication  Instructions  Start Date  Stop Date  Generic Name  NDC  Status  
Provider  Patient Instruction









 SAVELLA 100 MG ORAL TABS  1 tablet twice daily       MILNACIPRAN HCL
  [id number]  Active  Stephanie Dove MD     Active

 

 AMITRIPTYLINE HCL 25 MG ORAL TABS  1.5 tablets once daily       
AMITRIPTYLINE HCL  86698868594  Active  Stephanie Dove MD     Active

 

 TIZANIDINE HCL 4 MG ORAL TABS  1/2 tablet 2 to 3x daily       
TIZANIDINE HCL  06170155609  Active  Stephanie Dove MD     Active

 

 PANTOPRAZOLE SODIUM 40 MG ORAL TBEC  1 tablet daily       
PANTOPRAZOLE SODIUM  99608725790  Active  Stephanie Dove MD     Active

 

 RANITIDINE  MG ORAL TABS  1 tablet twice daily       
RANITIDINE HCL  49308129826  Active  Stephanie Dove MD     Active

 

 CELECOXIB 200 MG ORAL CAPS  1 tablet twice daily       CELECOXIB  
92946345142  Active  Stephanie Dove MD     Active

 

 LEVO-T 25 MCG ORAL TABS  1 tablet daily       LEVOTHYROXINE SODIUM  
46745917409  Active  Stephanie Dove MD     Active

 

 B-12 1000 MCG ORAL CAPS  1 capsule daily       CYANOCOBALAMIN  
39635744318  Active  Stephanie Dove MD     Active

 

 INVOKANA 300 MG ORAL TABS  take 1 tablet by mouth daily before the first meal 
of the day       CANAGLIFLOZIN  81029496134  Active  Stephanie Dove MD
     Active

 

 FENOFIBRATE 150 MG ORAL CAPS  1 tablet daily       FENOFIBRATE  
93961378154  Active  Stephanie Dove MD     Active

 

 GLYBURIDE MICRONIZED 3 MG TABS  Take one by mouth daily    GLYBURIDE MICRONIZED  25125509704  No Longer Active  Stephanie Dove MD     
Active

 

 ALPRAZOLAM TABS  Take one by mouth at bedtime as needed    ALPRAZOLAM TABS  58148394999  No Longer Active  Stephanie Dove MD     Active

 

 ALBUTEROL SULFATE 0.083 % NEBU SOLN  one vial per nebulizer needed  
     ALBUTEROL SULFATE  69172404968  Active  Jero Luz MD     Active

 

 GABAPENTIN 300 MG CAPS  2 po q hs for nerve pain       GABAPENTIN  
27986583801  Active  Jero Luz MD     Active









 ALPRAZOLAM TABS  Take one by mouth at bedtime as needed    ALPRAZOLAM TABS     ALPRAZOLAM TABS  Inactive

 

 GLYBURIDE MICRONIZED 3 MG TABS  Take one by mouth daily    GLYBURIDE MICRONIZED 3 MG TABS  808709  GLYBURIDE MICRONIZED  Inactive







Vital Signs







 Date  Name  Value  Unit  Range  Description

 

   blood pressure, diastolic - 8462-4  80  mm[Hg]     BP graham

 

   blood pressure, systolic - 8480-6  133  mm[Hg]     BP sys

 

   pulse rate E&M - 8867-4  104  /min     Heart rate

 

   temperature E&M  98.0  [degF]     Body temperature

 

   weight E&M - 3141-9  223  [lb_av]     Weight Measured







Diagnostic Results







 Date  Name  Value  Unit  Range  Description

 

 Chart Maintenance: Outside labs entered on flowsheet - Chemistry 

 

   sodium, serum  133  mmol/L      

 

   potassium, serum  3.8  mmol/L      

 

   blood glucose  275  mg/dL      

 

   creatinine, serum  0.64  mg/dL      

 

   aspartate aminotransferase (SGOT), serum  34  U/L      

 

   alanine aminotransferase (SGPT), serum  53  U/L      

 

   alkaline phosphatase, serum  129  U/L      

 

   protein, total urine random  2+  mg/dL      

 

 Chart Maintenance: Outside labs entered on flowsheet - Hematology 

 

   leukocyte count, blood  10.7  10*3/mm3      

 

   hemoglobin, blood  15.9  g/dL      

 

   platelet count  308  10*3/mm3      

 

 Lab Report: Chlamydia/GC APTIMA/83013 - Lab 

 

   chlamydia DNA probe  NOT DETECTED     NOT DETECTED   

 

 Lab Report: Chlamydia/GC APTIMA/17318 - Microbiology 

 

   Neisseria gonorrhoeae DNA probe  NOT DETECTED     NOT DETECTED   







Encounters







 Code  Encounter  Date  Provider  Facility

 

 CPT-60399  Level 3 Est. Patient   15:22:48 CST  Jero Luz MD  
[facility]







Procedures







 Code  Procedure Name  Date  Entry Date  Standard Description

 

 CPT-66823  Endometrial bx wo cervical dil   15:31:33 CDT  
   

 

 CPT-OV  Office Visit   14:46:14 CDT     

 

 CPT-83247  Postop F/U Visit   06:18:39 CST

## 2018-08-17 NOTE — XMS REPORT
[facility] Clinical Summary

 Created on: 2016



DanaKimberly

External Reference #: [id number]

: 1975

Sex: Female



Demographics







 Address  [address] [place]  [address]

 

 Home Phone  [phone]

 

 Preferred Language  English

 

 Marital Status  S

 

 Caodaism Affiliation  Unknown

 

 Race  Unknown

 

 Ethnic Group  Not  or 





Author







 Author  Admin, E

 

 Organization  [facility] [email or web address]

 

 Address  Unknown

 

 Phone  Unavailable







Allergies, Adverse Reactions, Alerts







 Allergy Name  Reaction Description  Start Date  Severity  Status  Provider

 

 No Known Allergies             DEMOND Kyle 







Conditions or Problems







 Problem Name  Problem Code  Onset Date  Status  Entry Date  Provider  Comment  
Standard Description  Annotate

 

 Asthma  493.90    Active    Jero Luz MD     Asthma, 
unspecified   (History of)

 

 Diabetes, Type 2  250.00    Active    Jero Luz MD   
  Diabetes mellitus without mention of complication, type II or unspecified type
, not stated as uncontrolled   

 

 FH Stroke  V17.1    Active    Jero Luz MD     Family 
history of stroke (cerebrovascular)   

 

 FH Diabetes  V18.0    Active    Jero Luz MD     
Family history of diabetes mellitus   

 

 Chronic tonsillitis  474.00    Active    Jero Luz MD
     Chronic tonsillitis   

 

 AFTERCARE FLW SURG TEETH ORL CAV&DIGESTV SYS NEC  V58.75    Active  
  Jero Luz MD     Aftercare following surgery of the teeth,oral 
cavity and digestive system, NEC   

 

 Irregular menses  626.4    Active    Stephanie Dove MD     
Irregular menstrual cycle   

 

 Stress incontinence  788.32    Active    Stephanie Dove MD 
    Stress incontinence, male   

 

 Hx of endometrial ablation  V45.89    Active    Stephanie Dove MD     Other postsurgical status   

 

 Family history of uterine cancer  V16.49    Active    Stephanie Dove MD     Family history of malignant neoplasm of other genital organ   







Medication List







 Medication  Instructions  Start Date  Stop Date  Generic Name  NDC  Status  
Provider  Patient Instruction









 SAVELLA 100 MG ORAL TABS  1 tablet twice daily       MILNACIPRAN HCL
  [id number]  Active  Stephanie Dove MD     Active

 

 AMITRIPTYLINE HCL 25 MG ORAL TABS  1.5 tablets once daily       
AMITRIPTYLINE HCL  87204361236  Active  Stephanie Dove MD     Active

 

 TIZANIDINE HCL 4 MG ORAL TABS  1/2 tablet 2 to 3x daily       
TIZANIDINE HCL  67536025743  Active  Stephanie Dove MD     Active

 

 PANTOPRAZOLE SODIUM 40 MG ORAL TBEC  1 tablet daily       
PANTOPRAZOLE SODIUM  45991012579  Active  Stephanie Dove MD     Active

 

 RANITIDINE  MG ORAL TABS  1 tablet twice daily       
RANITIDINE HCL  60826894962  Active  Stephanie Dove MD     Active

 

 CELECOXIB 200 MG ORAL CAPS  1 tablet twice daily       CELECOXIB  
[id number]  Active  Stephanie Dove MD     Active

 

 LEVO-T 25 MCG ORAL TABS  1 tablet daily       LEVOTHYROXINE SODIUM  
02569179129  Active  Stephanie Dove MD     Active

 

 B-12 1000 MCG ORAL CAPS  1 capsule daily       CYANOCOBALAMIN  
37016644415  Active  Stephanie Dove MD     Active

 

 INVOKANA 300 MG ORAL TABS  take 1 tablet by mouth daily before the first meal 
of the day       CANAGLIFLOZIN  31894598934  Active  Stephanie Dove MD
     Active

 

 FENOFIBRATE 150 MG ORAL CAPS  1 tablet daily       FENOFIBRATE  
02966830604  Active  Stephanie Dove MD     Active

 

 GLYBURIDE MICRONIZED 3 MG TABS  Take one by mouth daily    GLYBURIDE MICRONIZED  15880541440  No Longer Active  Stephanie Dove MD     
Active

 

 ALPRAZOLAM TABS  Take one by mouth at bedtime as needed    ALPRAZOLAM TABS  50491074075  No Longer Active  Stephanie Dove MD     Active

 

 ALBUTEROL SULFATE 0.083 % NEBU SOLN  one vial per nebulizer needed  
     ALBUTEROL SULFATE  49528541701  Active  Jero Luz MD     Active

 

 GABAPENTIN 300 MG CAPS  2 po q hs for nerve pain       GABAPENTIN  
99908031400  Active  Jero Luz MD     Active









 ALPRAZOLAM TABS  Take one by mouth at bedtime as needed    ALPRAZOLAM TABS     ALPRAZOLAM TABS  Inactive

 

 GLYBURIDE MICRONIZED 3 MG TABS  Take one by mouth daily    GLYBURIDE MICRONIZED 3 MG TABS  [id number]  GLYBURIDE MICRONIZED  Inactive







Encounters







 Code  Encounter  Date  Provider  Facility

 

 CPT-43809  Level 3 Est. Patient   15:22:48 CST  Jero Luz MD  
[facility]







Procedures







 Code  Procedure Name  Date  Entry Date  Standard Description

 

 CPT-39933  Endometrial bx wo cervical dil   15:31:33 CDT  
   

 

 CPT-OV  Office Visit   14:46:14 CDT     

 

 CPT-03086  Postop F/U Visit   06:18:39 CST

## 2018-08-17 NOTE — XMS REPORT
[facility] Clinical Summary

 Created on: 2016



Dana Nicogaurang TIAN

External Reference #: [id number]

: 1975

Sex: Female



Demographics







 Address  [address] [place]  [address]

 

 Home Phone  [email or web address]

 

 Preferred Language  English

 

 Marital Status  S

 

 Jew Affiliation  Unknown

 

 Race  Unknown

 

 Ethnic Group  Not  or 





Author







 Author  Admin, E

 

 Organization  [facility]

 

 Address  Unknown

 

 Phone  Unavailable







Allergies, Adverse Reactions, Alerts







 Allergy Name  Reaction Description  Start Date  Severity  Status  Provider

 

 No Known Allergies             Ching Vee







Conditions or Problems







 Problem Name  Problem Code  Onset Date  Status  Entry Date  Provider  Comment  
Standard Description  Annotate

 

 Asthma  493.90    Active    Jero Luz MD     Asthma, 
unspecified   (History of)

 

 Diabetes, Type 2  250.00    Active    Jero Luz MD   
  Diabetes mellitus without mention of complication, type II or unspecified type
, not stated as uncontrolled   

 

 FH Stroke  V17.1    Active    Jero Luz MD     Family 
history of stroke (cerebrovascular)   

 

 FH Diabetes  V18.0    Active    Jero Luz MD     
Family history of diabetes mellitus   

 

 Chronic tonsillitis  474.00    Active    Jero Luz MD
     Chronic tonsillitis   

 

 AFTERCARE FLW SURG TEETH ORL CAV&DIGESTV SYS NEC  V58.75    Active  
  Jero Luz MD     Aftercare following surgery of the teeth,oral 
cavity and digestive system, NEC   

 

 Irregular menses  626.4    Active    Stephanie Dove MD     
Irregular menstrual cycle   

 

 Stress incontinence  788.32    Active    Stephanie Dove MD 
    Stress incontinence, male   

 

 Hx of endometrial ablation  V45.89    Active    Stephanie Dove MD     Other postsurgical status   

 

 Family history of uterine cancer  V16.49    Active    Stephanie Dove MD     Family history of malignant neoplasm of other genital organ   







Medication List







 Medication  Instructions  Start Date  Stop Date  Generic Name  NDC  Status  
Provider  Patient Instruction









 SAVELLA 100 MG ORAL TABS  1 tablet twice daily       MILNACIPRAN HCL
  [id number]  Active  Stephanie Dove MD     Active

 

 AMITRIPTYLINE HCL 25 MG ORAL TABS  1.5 tablets once daily       
AMITRIPTYLINE HCL  80130933334  Active  Stephanie Dove MD     Active

 

 TIZANIDINE HCL 4 MG ORAL TABS  1/2 tablet 2 to 3x daily       
TIZANIDINE HCL  93380471590  Active  Stephanie Dove MD     Active

 

 PANTOPRAZOLE SODIUM 40 MG ORAL TBEC  1 tablet daily       
PANTOPRAZOLE SODIUM  28808265515  Active  Stephanie Dove MD     Active

 

 RANITIDINE  MG ORAL TABS  1 tablet twice daily       
RANITIDINE HCL  35676333793  Active  Stephanie Dove MD     Active

 

 CELECOXIB 200 MG ORAL CAPS  1 tablet twice daily       CELECOXIB  
71085833429  Active  Stephanie Dove MD     Active

 

 LEVO-T 25 MCG ORAL TABS  1 tablet daily       LEVOTHYROXINE SODIUM  
78921804031  Active  Stephanie Dove MD     Active

 

 B-12 1000 MCG ORAL CAPS  1 capsule daily       CYANOCOBALAMIN  
54255491031  Active  Stephanie Dove MD     Active

 

 INVOKANA 300 MG ORAL TABS  take 1 tablet by mouth daily before the first meal 
of the day       CANAGLIFLOZIN  20738793843  Active  Stephanie Dove MD
     Active

 

 FENOFIBRATE 150 MG ORAL CAPS  1 tablet daily       FENOFIBRATE  
97170202379  Active  Stephanie Dove MD     Active

 

 GLYBURIDE MICRONIZED 3 MG TABS  Take one by mouth daily    GLYBURIDE MICRONIZED  79392833315  No Longer Active  Stephanie Dove MD     
Active

 

 ALPRAZOLAM TABS  Take one by mouth at bedtime as needed    ALPRAZOLAM TABS  87119256951  No Longer Active  Stephanie Dove MD     Active

 

 ALBUTEROL SULFATE 0.083 % NEBU SOLN  one vial per nebulizer needed  
     ALBUTEROL SULFATE  73310832364  Active  Jero Luz MD     Active

 

 GABAPENTIN 300 MG CAPS  2 po q hs for nerve pain       GABAPENTIN  
97555860212  Active  Jero Luz MD     Active









 ALPRAZOLAM TABS  Take one by mouth at bedtime as needed    ALPRAZOLAM TABS     ALPRAZOLAM TABS  Inactive

 

 GLYBURIDE MICRONIZED 3 MG TABS  Take one by mouth daily    GLYBURIDE MICRONIZED 3 MG TABS  [id number]  GLYBURIDE MICRONIZED  Inactive







Vital Signs







 Date  Name  Value  Unit  Range  Description

 

   blood pressure, diastolic - 8462-4  80  mm[Hg]     BP graham

 

   blood pressure, systolic - 8480-6  133  mm[Hg]     BP sys

 

   pulse rate E&M - 8867-4  104  /min     Heart rate

 

   temperature E&M  98.0  [degF]     Body temperature

 

   weight E&M - 3141-9  223  [lb_av]     Weight Measured







Diagnostic Results







 Date  Name  Value  Unit  Range  Description

 

 Chart Maintenance: Outside labs entered on flowsheet - Chemistry 

 

   sodium, serum  133  mmol/L      

 

   potassium, serum  3.8  mmol/L      

 

   blood glucose  275  mg/dL      

 

   creatinine, serum  0.64  mg/dL      

 

   aspartate aminotransferase (SGOT), serum  34  U/L      

 

   alanine aminotransferase (SGPT), serum  53  U/L      

 

   alkaline phosphatase, serum  129  U/L      

 

   protein, total urine random  2+  mg/dL      

 

 Chart Maintenance: Outside labs entered on flowsheet - Hematology 

 

   leukocyte count, blood  10.7  10*3/mm3      

 

   hemoglobin, blood  15.9  g/dL      

 

   platelet count  308  10*3/mm3      

 

 Lab Report: Chlamydia/GC APTIMA/03117 - Lab 

 

   chlamydia DNA probe  NOT DETECTED     NOT DETECTED   

 

 Lab Report: Chlamydia/GC APTIMA/33108 - Microbiology 

 

   Neisseria gonorrhoeae DNA probe  NOT DETECTED     NOT DETECTED   







Encounters







 Code  Encounter  Date  Provider  Facility

 

 CPT-27531  Level 3 Est. Patient   15:22:48 CST  Jero Luz MD  
[facility]







Procedures







 Code  Procedure Name  Date  Entry Date  Standard Description

 

 CPT-72947  Endometrial bx wo cervical dil   15:31:33 CDT  
   

 

 CPT-OV  Office Visit   14:46:14 CDT     

 

 CPT-86012  Postop F/U Visit   06:18:39 CST

## 2018-08-17 NOTE — XMS REPORT
[facility] Clinical Summary

 Created on: 2016



DanaKimberly

External Reference #: [id number]

: 1975

Sex: Female



Demographics







 Address  [address] [place]  [address]

 

 Home Phone  [email or web address]

 

 Preferred Language  English

 

 Marital Status  S

 

 Zoroastrianism Affiliation  Unknown

 

 Race  Unknown

 

 Ethnic Group  Not  or 





Author







 Author  Admin, E

 

 Organization  [facility]

 

 Address  Unknown

 

 Phone  Unavailable







Allergies, Adverse Reactions, Alerts







 Allergy Name  Reaction Description  Start Date  Severity  Status  Provider

 

 No Known Allergies             DEMOND Kyle 







Conditions or Problems







 Problem Name  Problem Code  Onset Date  Status  Entry Date  Provider  Comment  
Standard Description  Annotate

 

 Asthma  493.90    Active    Jero Luz MD     Asthma, 
unspecified   (History of)

 

 Diabetes, Type 2  250.00    Active    Jero Luz MD   
  Diabetes mellitus without mention of complication, type II or unspecified type
, not stated as uncontrolled   

 

 FH Stroke  V17.1    Active    Jero Luz MD     Family 
history of stroke (cerebrovascular)   

 

 FH Diabetes  V18.0    Active    Jero Luz MD     
Family history of diabetes mellitus   

 

 Chronic tonsillitis  474.00    Active    Jero Luz MD
     Chronic tonsillitis   

 

 AFTERCARE FLW SURG TEETH ORL CAV&DIGESTV SYS NEC  V58.75    Active  
  Jero Luz MD     Aftercare following surgery of the teeth,oral 
cavity and digestive system, NEC   

 

 Irregular menses  626.4    Active    Stephanie Dove MD     
Irregular menstrual cycle   

 

 Stress incontinence  788.32    Active    Stephanie Dove MD 
    Stress incontinence, male   

 

 Hx of endometrial ablation  V45.89    Active    Stephanie Dove MD     Other postsurgical status   

 

 Family history of uterine cancer  V16.49    Active    Stephanie Dove MD     Family history of malignant neoplasm of other genital organ   







Medication List







 Medication  Instructions  Start Date  Stop Date  Generic Name  NDC  Status  
Provider  Patient Instruction









 SAVELLA 100 MG ORAL TABS  1 tablet twice daily       MILNACIPRAN HCL
  [id number]  Active  Stephanie Dove MD     Active

 

 AMITRIPTYLINE HCL 25 MG ORAL TABS  1.5 tablets once daily       
AMITRIPTYLINE HCL  15314343912  Active  Stephanie Dove MD     Active

 

 TIZANIDINE HCL 4 MG ORAL TABS  1/2 tablet 2 to 3x daily       
TIZANIDINE HCL  90070640175  Active  Stephanie Dove MD     Active

 

 PANTOPRAZOLE SODIUM 40 MG ORAL TBEC  1 tablet daily       
PANTOPRAZOLE SODIUM  05903434678  Active  Stephanie Dove MD     Active

 

 RANITIDINE  MG ORAL TABS  1 tablet twice daily       
RANITIDINE HCL  [id number]  Active  Stephanie Dove MD     Active

 

 CELECOXIB 200 MG ORAL CAPS  1 tablet twice daily       CELECOXIB  
[id number]  Active  Stephanie Dove MD     Active

 

 LEVO-T 25 MCG ORAL TABS  1 tablet daily       LEVOTHYROXINE SODIUM  
94061672598  Active  Stephanie Dove MD     Active

 

 B-12 1000 MCG ORAL CAPS  1 capsule daily       CYANOCOBALAMIN  
72656170383  Active  Stephanie Dove MD     Active

 

 INVOKANA 300 MG ORAL TABS  take 1 tablet by mouth daily before the first meal 
of the day       CANAGLIFLOZIN  [id number]  Active  Stephanie Dove MD
     Active

 

 FENOFIBRATE 150 MG ORAL CAPS  1 tablet daily       FENOFIBRATE  
57045957197  Active  Stephanie Dove MD     Active

 

 GLYBURIDE MICRONIZED 3 MG TABS  Take one by mouth daily    GLYBURIDE MICRONIZED  08784742358  No Longer Active  Stephanie Dove MD     
Active

 

 ALPRAZOLAM TABS  Take one by mouth at bedtime as needed    ALPRAZOLAM TABS  57483754666  No Longer Active  Stephanie Dove MD     Active

 

 ALBUTEROL SULFATE 0.083 % NEBU SOLN  one vial per nebulizer needed  
     ALBUTEROL SULFATE  11960771194  Active  Jero Luz MD     Active

 

 GABAPENTIN 300 MG CAPS  2 po q hs for nerve pain       GABAPENTIN  
87868386214  Active  Jero Luz MD     Active









 ALPRAZOLAM TABS  Take one by mouth at bedtime as needed    ALPRAZOLAM TABS     ALPRAZOLAM TABS  Inactive

 

 GLYBURIDE MICRONIZED 3 MG TABS  Take one by mouth daily    GLYBURIDE MICRONIZED 3 MG TABS  [id number]  GLYBURIDE MICRONIZED  Inactive







Encounters







 Code  Encounter  Date  Provider  Facility

 

 CPT-56095  Level 3 Est. Patient   15:22:48 CST  Jero Luz MD  
[facility]







Procedures







 Code  Procedure Name  Date  Entry Date  Standard Description

 

 CPT-79037  Endometrial bx wo cervical dil   15:31:33 CDT  
   

 

 CPT-OV  Office Visit   14:46:14 CDT     

 

 CPT-86185  Postop F/U Visit   06:18:39 CST

## 2018-08-17 NOTE — XMS REPORT
[facility] Clinical Summary

 Created on: 2016



Dana Nicogaurang TIAN

External Reference #: [id number]

: 1975

Sex: Female



Demographics







 Address  [address] [place]  [address]

 

 Home Phone  [email or web address]

 

 Preferred Language  English

 

 Marital Status  S

 

 Orthodoxy Affiliation  Unknown

 

 Race  Unknown

 

 Ethnic Group  Not  or 





Author







 Author  Admin, E

 

 Organization  [facility]

 

 Address  Unknown

 

 Phone  Unavailable







Allergies, Adverse Reactions, Alerts







 Allergy Name  Reaction Description  Start Date  Severity  Status  Provider

 

 No Known Allergies             DEMOND Kyle 







Conditions or Problems







 Problem Name  Problem Code  Onset Date  Status  Entry Date  Provider  Comment  
Standard Description  Annotate

 

 Asthma  493.90    Active    Jero Luz MD     Asthma, 
unspecified   (History of)

 

 Diabetes, Type 2  250.00    Active    Jero Luz MD   
  Diabetes mellitus without mention of complication, type II or unspecified type
, not stated as uncontrolled   

 

 FH Stroke  V17.1    Active    Jero Luz MD     Family 
history of stroke (cerebrovascular)   

 

 FH Diabetes  V18.0    Active    Jero Luz MD     
Family history of diabetes mellitus   

 

 Chronic tonsillitis  474.00    Active    Jero Luz MD
     Chronic tonsillitis   

 

 AFTERCARE FLW SURG TEETH ORL CAV&DIGESTV SYS NEC  V58.75    Active  
  Jero Luz MD     Aftercare following surgery of the teeth,oral 
cavity and digestive system, NEC   

 

 Irregular menses  626.4    Active    Stephanie Dove MD     
Irregular menstrual cycle   

 

 Stress incontinence  788.32    Active    Stephanie Dove MD 
    Stress incontinence, male   

 

 Hx of endometrial ablation  V45.89    Active    Stephanie Dove MD     Other postsurgical status   

 

 Family history of uterine cancer  V16.49    Active    Stephanie Dove MD     Family history of malignant neoplasm of other genital organ   







Medication List







 Medication  Instructions  Start Date  Stop Date  Generic Name  NDC  Status  
Provider  Patient Instruction









 SAVELLA 100 MG ORAL TABS  1 tablet twice daily       MILNACIPRAN HCL
  50046178848  Active  Stephanie Dove MD     Active

 

 AMITRIPTYLINE HCL 25 MG ORAL TABS  1.5 tablets once daily       
AMITRIPTYLINE HCL  71155043243  Active  Stephanie Dove MD     Active

 

 TIZANIDINE HCL 4 MG ORAL TABS  1/2 tablet 2 to 3x daily       
TIZANIDINE HCL  25668060430  Active  Stephanie Dove MD     Active

 

 PANTOPRAZOLE SODIUM 40 MG ORAL TBEC  1 tablet daily       
PANTOPRAZOLE SODIUM  49992569012  Active  Stephanie Dove MD     Active

 

 RANITIDINE  MG ORAL TABS  1 tablet twice daily       
RANITIDINE HCL  23985678702  Active  Stephanie Dove MD     Active

 

 CELECOXIB 200 MG ORAL CAPS  1 tablet twice daily       CELECOXIB  
36629278146  Active  Stephanie Dove MD     Active

 

 LEVO-T 25 MCG ORAL TABS  1 tablet daily       LEVOTHYROXINE SODIUM  
75708727900  Active  Stephanie Dove MD     Active

 

 B-12 1000 MCG ORAL CAPS  1 capsule daily       CYANOCOBALAMIN  
50040724243  Active  Stephanie Dove MD     Active

 

 INVOKANA 300 MG ORAL TABS  take 1 tablet by mouth daily before the first meal 
of the day       CANAGLIFLOZIN  67719816747  Active  Stephanie Dove MD
     Active

 

 FENOFIBRATE 150 MG ORAL CAPS  1 tablet daily       FENOFIBRATE  
20168338085  Active  Stephanie Dove MD     Active

 

 GLYBURIDE MICRONIZED 3 MG TABS  Take one by mouth daily    GLYBURIDE MICRONIZED  52799354127  No Longer Active  Stephanie Dove MD     
Active

 

 ALPRAZOLAM TABS  Take one by mouth at bedtime as needed    ALPRAZOLAM TABS  46833126380  No Longer Active  Stephanie Dove MD     Active

 

 ALBUTEROL SULFATE 0.083 % NEBU SOLN  one vial per nebulizer needed  
     ALBUTEROL SULFATE  98797709423  Active  Jero Luz MD     Active

 

 GABAPENTIN 300 MG CAPS  2 po q hs for nerve pain       GABAPENTIN  
75397667865  Active  Jero Luz MD     Active









 ALPRAZOLAM TABS  Take one by mouth at bedtime as needed    ALPRAZOLAM TABS     ALPRAZOLAM TABS  Inactive

 

 GLYBURIDE MICRONIZED 3 MG TABS  Take one by mouth daily    GLYBURIDE MICRONIZED 3 MG TABS  [id number]  GLYBURIDE MICRONIZED  Inactive







Diagnostic Results







 Date  Name  Value  Unit  Range  Description

 

 Lab Report: Chlamydia/GC APTIMA/09704 - Lab 

 

   chlamydia DNA probe  NOT DETECTED     NOT DETECTED   

 

 Lab Report: Chlamydia/GC APTIMA/20401 - Microbiology 

 

   Neisseria gonorrhoeae DNA probe  NOT DETECTED     NOT DETECTED   







Encounters







 Code  Encounter  Date  Provider  Facility

 

 CPT-05900  Level 3 Est. Patient   15:22:48 CST  Jero Luz MD  
[facility]







Procedures







 Code  Procedure Name  Date  Entry Date  Standard Description

 

 CPT-46631  Endometrial bx wo cervical dil   15:31:33 CDT  
   

 

 CPT-OV  Office Visit   14:46:14 CDT     

 

 CPT-46743  Postop F/U Visit   06:18:39 CST

## 2018-08-17 NOTE — XMS REPORT
Transition of Care Document

 Created on: 2016



LONG ROMO

External Reference #: [id number]

: 1975

Sex: Female



Demographics







 Address  [address] [place], [place]  [address]

 

 Home Phone  [phone]

 

 Preferred Language  English

 

 Marital Status  Unknown

 

 Gnosticism Affiliation  Unknown

 

 Race  White

 

 Ethnic Group  Not  or 





Author







 [facility]

 

 Organization  [facility]

 

 Address  [address] [place]  [id number]



 

 Phone  [phone]







Care Team Providers







 Care Team Member Name  Role  Phone

 

 MARIA ALEJANDRA MAHAN  PP  [phone]

 

 MARIA ALEJANDRA MAHAN PP  [phone]







Summary purpose

TRANSITION OF CARE AUTO GENERATION



Chief Complaint and Reason for Visit







 Admit Diagnosis 1  RAL HYSTERECTOMY W BSO







Problem list

No authorized problems tracked for continuity of care are available for this 
visit.



Encounters

No authorized problems tracked for encounter diagnoses are available for this 
visit.



Medications

No medications recorded for this patient visit



Allergies, adverse reactions, alerts







 Allergen  Category  Ingredient  Status  Reaction  Severity  Onset

 

 Anectine  Drug Allergy  Anectine  Confirmed or Verified  Family hx of severe 
rx to Anecti  Unknown  

 

 Anectine  Drug Allergy  succinylcholine  Confirmed or Verified  Family hx of 
severe rx to Anecti  Unknown  







Immunizations

No immunizations recorded for this patient visit



Relevant diagnostic tests and/or laboratory data







 RESULTS 

 

 99-06-295732:47:00 

 

 Progress Note 

 

 

 

 

 PROGRESS NOTE 2016 09:47:42
  

 

 

 

 

 S: Patient states pain well controlled. Tolerating by mouth. No nausea
 

 

 or vomiting. Ambulating and voiding without difficulty.
  

 

 

 

 

 After confronting the patient regarding her diabetes care she admits
 

 

 that while she takes her __________ occasionally she has not followed up
 

 

 with Olivia Rodriguez as she previously led on. Reports her last hemoglobin
  

 

 A1c was actually 6 months ago. I had been under the impression she was
 

 

 taking care with Dr. Chua. She also admits that she has not been
 

 

 taking her blood sugars regularly and cannot tell us what they had been
  

 

 running.
 

 

 

 

 

 O: VITAL SIGNS: Stable. Patient is afebrile. GENERAL: Alert and
  

 

 oriented x3. No acute distress, sitting comfortably on the edge of the
 

 

 bed. Hemoglobin is stable. Other labs are unremarkable other than her
  

 

 blood sugars which remained considerably elevated 2 to 300.
  

 

 

 

 

 A/P:1. Status post robotic assisted laparoscopic hysterectomy with
 

 

 bilateral salpingo-oophorectomy postoperative day 1. Postoperatively,
  

 

 she is doing well and could consider dismissal upon approval by our
  

 

 consult.
 

 

 2. Uncontrolled type 2 diabetes. Discussed with the patient the
  

 

 importance of controlling her blood sugars especially during the
 

 

 immediate postoperative period for healing purposes. Discussed risk of
 

 

 infection and vaginal cuff dehiscence. Also discussed with her diabetes
  

 

 long-term consequences including renal and cardiac damage. Recommend she
 

 

 have tighter control and either have better follow up with Olivia Rodriguez
  

 

 or ask Dr. Myles if he would consider accepting her as a patient.
 

 

 3. We will have the dietitian speak with her regarding her
 

 

 diabetic diet.
 

 

 4. Offered her an appointment with Melissa Baker our diabetic
  

 

 specialist at [facility].
 

 

 5. Tobaccoism. Discussed with her the intraoperative complications
 

 

 including difficulty in placing her in the lithotomy position due to
 

 

 intolerance from her respiratory standpoint. Discussed especially given
  

 

 her diabetes it is especially important to control her other health
  

 

 components and recommend tobacco cessation immediately.
  

 

 6. Continue routine postoperative care.
  

 

 

 

 

 Oral Otero MD
  

 

 /nh 2016 09:47:42/2016 10:02:39
  

 

 Clinic Code:
 

 

 cc:
  

 

 [facility]
 

 

 [address] [place] [address]<END HEADER>
  

 

 

 

 

 

 

 

  

 

 Routine Urinalysis 

 

 07-00-337779:35:00 

 

     Result  Normal Range  Units

 

 Color    YELLOW    

 

 Clarity 

 

 Slighty cloudy
 

 

 Specific Gravity    1.025  1.003-1.035  

 

 pH    6.5  4.5-8.0  

 

 Glucose    3+    

 

 Bilirubin    NEGATIVE    

 

 Ketones    NEGATIVE    

 

 Protein    2+    

 

 Urobilinogen    0.2  0-0.2  E.U./dL

 

 Nitrites    NEGATIVE    

 

 Blood    NEGATIVE    

 

 Leukocytes    NEGATIVE    

 

 WBCs    No WBC's Seen    

 

 RBCs    0-5    

 

 Squamous Epithelial    3+    

 

 Bacteria    Occasional    

 

 Yeast    3+    

 

  

 

 Chemistry 

 

 14-15-763554:15:00 

 

     Result  Normal Range  Units

 

 Sodium    137  134-145  mEq/l

 

 Potassium    4.3  3.5-5.1  mEq/l

 

 Chloride    100    mEq/l

 

 CO2  H  28.2  22-28  mEq/l

 

 Glucose  H  289    mg/dl

 

 BUN    13  7-18  mg/dl

 

 Creatinine    0.64  0.6-1.0  mg/dl

 

 Calcium    8.6  8.4-10.2  mg/dl

 

 TP - Total Protein    6.2  6.0-8.3  g/dl

 

 Albumin  L  3.2  3.5-5  g/dl

 

 Bilirubin - Total    0.2  0.1-1.0  mg/dl

 

 AST    22  10-42  IU/L

 

 ALT    47  12-65  IU/L

 

 ALP  H  108  25-72  IU/L

 

 Osmolality    284.5  280-300  mOsm/L

 

 Albumin/Globulin Ratio    1.1  0-8  

 

 Anion GAP    8.8  8-16  

 

 BUN/Creatinine Ratio  H  20.3  10-20  

 

 Estimated GFR    104  >=60  mL/min/1.7

 

 :35:00 

 

     Result  Normal Range  Units

 

 Sodium  L  133  134-145  mEq/l

 

 Potassium    3.8  3.5-5.1  mEq/l

 

 Chloride  L  95    mEq/l

 

 CO2    27.4  22-28  mEq/l

 

 Glucose  H  275    mg/dl

 

 BUN    10  7-18  mg/dl

 

 Creatinine    0.64  0.6-1.0  mg/dl

 

 Calcium    8.9  8.4-10.2  mg/dl

 

 TP - Total Protein    7.7  6.0-8.3  g/dl

 

 Albumin    4.3  3.5-5  g/dl

 

 Bilirubin - Total    0.3  0.1-1.0  mg/dl

 

 AST    34  10-42  IU/L

 

 ALT    53  12-65  IU/L

 

 ALP  H  129  25-72  IU/L

 

 Osmolality  L  275.2  280-300  mOsm/L

 

 Albumin/Globulin Ratio    1.3  0-8  

 

 Anion GAP    10.6  8-16  

 

 BUN/Creatinine Ratio    15.6  10-20  

 

 Estimated GFR    104  >=60  mL/min/1.7

 

  

 

 Hematology 

 

 :15:00 

 

     Result  Normal Range  Units

 

 WBC  H  12.1  4.8-10.8  103/uL

 

 RBC    4.4  4.2-5.4  106/uL

 

 HGB    12.9  12.0-16.0  g/dl

 

 HCT    40.2  36.9-47.0  %

 

 MCV    91.8  81-99  FL

 

 MCH    29.5  27-31  pg

 

 MCHC  L  32.1  33-37  g/dl

 

 RDW    12.8  11.5-15.5  %

 

 PLT    273  130-400  103/uL

 

 MPV    9.7  7.3-10.4  FL

 

 :35:00 

 

     Result  Normal Range  Units

 

 WBC    10.7  4.8-10.8  103/uL

 

 RBC    5.3  4.2-5.4  106/uL

 

 HGB    15.9  12.0-16.0  g/dl

 

 HCT  H  47.9  36.9-47.0  %

 

 MCV    90.2  81-99  FL

 

 MCH    29.9  27-31  pg

 

 MCHC    33.2  33-37  g/dl

 

 RDW    13.3  11.5-15.5  %

 

 PLT    308  130-400  103/uL

 

 MPV    9.6  7.3-10.4  FL

 

  

 

 Special Chemistry 

 

 84-62-003496:05:00 

 

     Result  Normal Range  Units

 

 Hemoglobin A1C  H  11.1  4.5-6.2  %

 

  

 

 Urine Chemistry 

 

 65-27-545794:05:00 

 

     Result  Normal Range  Units

 

 Microalbumin    758.9    mg/l

 

 Reference Range Not Established
  

 

  

 

 Body Fluid 

 

 08-80-113724:35:00 

 

     Result  Normal Range  Units

 

 pH    6.5  4.5-8.0  

 

  

 

 Thyroid Testing 

 

 :05:00 

 

     Result  Normal Range  Units

 

 TSH    1.88  0.36-3.74  uIU/mL

 

  

 

 Radiology Results 

 

 :25:00 

 

 Chest XRay - Port - 1 View 

 

 PACs Image
 

 

 DATE OF EXAM: 2016
  

 

 

 

 

 RAD 0292-CHEST 1 VIEW PORT :
 

 

 

 

 

 

 

 

 RADIOLOGY REPORT
 

 

 

 

 

 DATE OF SERVICE: 16
 

 

 

 

 

 HISTORY: Patient is postop.
  

 

 

 

 

 PORTABLE ONE VIEW CHEST 1150 HOURS
 

 

 

 

 

 Heart and mediastinum are normal. Lungs are clear. No effusion is seen.
  

 

 

 

 

 IMPRESSION: Negative study of the chest.
 

 

 

 

 

 

 

 

 DO CATHI Kumari/nh 2016 12:19: / 2016 12:52:32
  

 

 cc:Dr. Oral Otero
 

 

 

 

 

 This document has been electronically
  

 

 Signed by: On:
 

 

 

 

 

 DATE OF EXAM: 2016
  

 

 

 

 

 RAD 0292-CHEST 1 VIEW PORT :
 

 

 

 

 

 

 

 

 RADIOLOGY REPORT
 

 

 

 

 

 DATE OF SERVICE: 16
 

 

 

 

 

 HISTORY: Patient is postop.
  

 

 

 

 

 PORTABLE ONE VIEW CHEST 1150 HOURS
 

 

 

 

 

 Heart and mediastinum are normal. Lungs are clear. No effusion is seen.
  

 

 

 

 

 IMPRESSION: Negative study of the chest.
 

 

 

 

 

 

 

 

 DO CATHI Kumari/nh 2016 12:19: / 2016 12:52:32
  

 

 cc:Dr. Oral Otero
 

 

 

 

 

 This document has been electronically
  

 

 Signed by: JOAQUÍN ROMERO DO On: 20162:24P
 

 

 

 

 

 RAL HYSTERECTOMY W BSO
 

 

 

 

 

 Result Amended on 2016 at 14:31:33. Previous status was [place].
  

 

 RAL HYSTERECTOMY W BSO
 

 

 

 

 

 34-69-192464:15:00 

 

     Result  Normal Range  Units

 

 MPV    9.7  7.3-10.4  FL

 

 18-30-348262:35:00 

 

     Result  Normal Range  Units

 

 MPV    9.6  7.3-10.4  FL







History of procedures







 Procedure Code  Code Type  Description  Date Performed  Performing Physician

 

 [id number]  ICD10  Resection of Uterus, Percutaneous Endoscopic Approach    ORAL OTERO

 

 [id number]  ICD10  Resection of Cervix, Percutaneous Endoscopic Approach    ORAL OTERO

 

 [id number]  ICD10  Resection of Bilateral Ovaries, Perc Endo Approach  2016  ORAL OTERO

 

 [id number]  ICD10  Resection of Bilateral Fallopian Tubes, Perc Endo Approach
  2016  ORAL OTERO

 

 [id number]  ICD10  Robotic Assisted Procedure of Trunk, Perc Endo Approach    ORAL OTERO

 

 60785  CPT-4  ROUTINE VENIPUNCTURE  2016  LUIS BOUCHER

 

 19704  CPT-4  ROUTINE VENIPUNCTURE  2016  ORAL OTERO

 

 74165  CPT-4  TLH W/T/O 250 G OR LESS  2016  ORAL OTERO

 

 08363  CPT-4  CHEST X-RAY  2016  ORAL OTERO

 

 27517  CPT-4  COMPREHEN METABOLIC PANEL  2016  ORAL OTERO

 

 26267  CPT-4  URINE PREGNANCY TEST  2016  ORAL OTERO

 

 85447  CPT-4  MICROALBUMIN, QUANTITATIVE  2016  ORAL OTERO

 

 46640  CPT-4  GLYCOSYLATED HEMOGLOBIN TEST  2016  ORAL OTERO

 

 76574  CPT-4  ASSAY THYROID STIM HORMONE  2016  ORAL OTERO

 

 90909  CPT-4  COMPLETE CBC, AUTOMATED  2016  ORAL OTERO

 

 77150  CPT-4  VITAL CAPACITY TEST  2016  ORAL OTERO

 

 70141  CPT-4  SPECIAL SUPPLIES  2016  ORAL OTERO

 

 97164  CPT-4  SPECIAL SUPPLIES  2016  ORAL OTERO

 

 86114  CPT-4  SPECIAL SUPPLIES  2016  ORAL OTERO

 

 70810  CPT-4  SPECIAL SUPPLIES  2016  ORAL OTERO

 

 44784  CPT-4  SPECIAL SUPPLIES  2016  ORAL OTERO

 

 34946  CPT-4  SPECIAL SUPPLIES  2016  ORAL OTERO

 

 68879  CPT-4  SPECIAL SUPPLIES  2016  ORAL OTERO

 

 81337  CPT-4  SPECIAL SUPPLIES  2016  ORAL OTERO

 

 93619  CPT-4  SPECIAL SUPPLIES  2016  ORAL OTERO

 

 14097  CPT-4  SPECIAL SUPPLIES  2016  ORAL OTERO

 

 03147  CPT-4  SPECIAL SUPPLIES  2016  ORAL OTERO

 

 11644  CPT-4  SPECIAL SUPPLIES  2016  ORAL OTERO

 

 32442  CPT-4  SPECIAL SUPPLIES  2016  ORAL OTERO

 

 97267  CPT-4  SPECIAL SUPPLIES  2016  ORAL OTERO

 

 38370  CPT-4  SPECIAL SUPPLIES  2016  ORAL OTERO

 

 69582  CPT-4  SPECIAL SUPPLIES  2016  ORAL OTERO

 

   CPT-4  INJ, BUPIVICAINE LIPOSOME  2016  ORAL OTERO

 

   CPT-4  ACETAMINOPHEN INJECTION  2016  ORAL OTERO

 

   CPT-4  CEFAZOLIN SODIUM INJECTION  2016  ORAL OTERO

 

   CPT-4  DEXAMETHASONE SODIUM PHOS  2016  ORAL OTERO

 

   CPT-4  INSULIN INJECTION  2016  ORAL OTERO

 

   CPT-4  TORADOL SYR 30MG/ML  2016  ORAL OTERO

 

   CPT-4  TORADOL SYR 30MG/ML  2016  ORAL OTERO

 

   CPT-4  TORADOL SYR 30MG/ML  2016  ORAL OTERO

 

   CPT-4  INJ MIDAZOLAM HYDROCHLORIDE  2016  ORAL OTERO

 

   CPT-4  INJ MIDAZOLAM HYDROCHLORIDE  2016  ORAL OTERO

 

   CPT-4  INJ MIDAZOLAM HYDROCHLORIDE  2016  ORAL OTERO

 

   CPT-4  MORPHINE SULFATE INJECTION  2016  ORAL OTERO

 

   CPT-4  ONDANSETRON HCL INJECTION  2016  ORAL OTERO

 

   CPT-4  INJ, PROPOFOL, 10 MG  2016  ORAL OTERO

 

   CPT-4  INJ, PROPOFOL, 10 MG  2016  ORAL OTERO

 

   CPT-4  INJ, PROPOFOL, 10 MG  2016  ORAL OTERO

 

   CPT-4  INJ, PROPOFOL, 10 MG  2016  ORAL OTERO

 

   CPT-4  INJ, PROPOFOL, 10 MG  2016  ORAL OTERO

 

   CPT-4  NEOSTIGMINE METHYLSLFTE INJ  2016  ORAL OTERO

 

   CPT-4  FENTANYL CITRATE INJECITON  2016  ORAL OTERO

 

   CPT-4  FENTANYL CITRATE INJECITON  2016  ORAL OTERO

 

   CPT-4  NORMAL SALINE SOLUTION INFUS  2016  DAISHA LOPEZ

 

   CPT-4  NORMAL SALINE SOLUTION INFUS  2016  ORAL OTERO

 

   CPT-4  RINGERS LACTATE INFUSION  2016  ORAL OTERO

 

   CPT-4  RINGERS LACTATE INFUSION  2016  ORAL OTERO

 

   CPT-4  RINGERS LACTATE INFUSION  2016  ORAL OTERO







Functional status







 Functional Status  Finding  Observation Time

 

 Hearing Prob Loc  none  01-07-729865:56

 

 Vision Problems  yes  :56

 

 Vision Correct Dev  glasses  71-60-775482:56

 

 Ambulation Asst Dev  none  86-22-031415:56

 

 Range of Motion  full  :00

 

 Muscle Strength RUE  5 ROM full resist  28-29-612704:00

 

 Muscle Strength RLE  5 ROM full resist  :00

 

 Muscle Strength LUE  5 ROM full resist  72-31-295427:00

 

 Muscle Strength LLE  5 ROM full resist  37-17-246305:00

 

 Transfers  assist x 1  :00

 

 Ambulation  in room  :00

 

 Balance  steady  :00

 

 Bathing Assistance  none  :56

 

 Eating Assistance  none  :56

 

 Dressing Assistance  none  51-14-396614:56

 

 Toileting Assistance  none  :56

 

 Transfer Assistance  none  :56

 

 Decline Slf Care/Mob  no  :56

 

 Phys Cond Stable  yes  :56

 

 Nutrition  normal  :00

 

 Diet  ADA specify calorie



Comment:  1800 ADA  :00

 

 Oral Cavity  moist and intact  :00

 

 Teeth  dentures  :00

 

 Dental Hygiene  good  :00

 

 Abdomen Appearance  round



Comment:  lap sites to abdomen  :00

 

 Abdomen  soft  :00

 

 Bowel Sounds  present  :00

 

 NG Tube  no  :00

 

 Feeding Tube  none  :00

 

 Deleon  no  52-31-590073:00

 

 Deleon Cath Type  regular  55-42-826511:05

 

 Deleon Patent  no  50-85-374026:00

 

 Cont Bladder Irr  no  :00

 

 Ostomy  no  :00

 

 Stool  other (specify)



Comment:  None noted at this time  :30

 

 Color  normal  :09

 

 Consistency  loose  :09

 

 Urination  normal  :00

 

 Urine Clarity  clear  :00

 

 Urine Color  pale yellow  :00

 

 Quality  sym/unlabored  :00

 

 Cough  absent  :00

 

 Secretions  no  :00

 

 Breath Sounds RUL  clear  :00

 

 Breath Sounds RML  clear  :00

 

 Breath Sounds RLL  clear  :00

 

 Breath Sounds ROSA ISELA  clear  :00

 

 Breath Sounds LLL  clear  :00

 

 Airway  natural  :00

 

 Chest Tube  no  :00

 

 Oxygen  no  67-48-512332:35

 

 Oxygen Mask Type  nasal cannula  27-38-487060:00

 

 Oxygen Flow Rate  1   30-58-772982:00

 

 C-PAP  no  02-07-131358:00

 

 BI-PAP  no  :00

 

 Temp >100.4  no  :00

 

 Temp <96.8  no  :00

 

 Chills with rigors  no  :00

 

 HR > 90bpm  no  :00

 

 Respirations > 20  no  :00

 

 Systolic <90  no  :00

 

 headache stiff neck  no  :00

 

 WBC > 21485  yes



Comment:  12.1  :00

 

 WBC < 4000  no  42-23-464630:00

 

 IV Site Location  L wrist/L Hand   96-52-990519:40

 

 IV Type  peripheral  :40

 

 IV Site Information  discontinued  :40

 

 IV Site Start Attmpt  2 times



Comment:  2 This result is a modification to a previously-entered result. It 
was modified on 16 at 07:44 by KIT.  :38

 

 IV Site Harmeet  18  :30

 

 IV Site Appearance  WNL  :30

 

 IV Site Color  clear  :

 

 IV Site Patent  yes  :30

 

 Dressing Type  occlusive  :30

 

 Nursing Note  pt. states feeling pretty good. still hurting though.  2016:19

 

 Cognitive Status  Finding  Observation Time

 

 Learning Ability  comprehends well  :40

 

 Neurological  no  :40

 

 Psychological  no  :40

 

 Physical  no  :40

 

 Hearing  no  :40

 

  Needed  no  :40

 

 Sign Language  no  :40

 

 Emotional  no  :40

 

 Vision  yes  :40

 

 Laguage  no  :40

 

 Financial  no  :40







Vital signs







 Type  Value  Date

 

 Respiration Rate  20breaths per minute  :35

 

 Pulse  87beats per minute  :35

 

 Oxygen Saturation  94%  :35

 

 BP Systolic  156mmHg  :35

 

 BP Diastolic  62mmHg  86-40-584181:35

 

 Temperature  98.4F  91-57-824236:35

 

 Height  62inches  :49

 

 Weight  222LB  07-79-079450:49







Social history







 Type  Value

 

 Smoking Status  CURRENT EVERY DAY SMOKER







Treatment Plan

No treatment plan text is available for this visit.



Hospital discharge instructions







 Discharge Date/Time  16 1245 

 

 Accompanied By  mother et son 

 

 Relationship  parent

 

 Dismissal Condition  good

 

 Disposition on DC  home

 

 Valuables  yes

 

 Valuable Type  cell phone

 

 Valuables Returned T  patient

 

 DC Inst/Educ Give  yes

 

 Exit Care Educ Given  yes

 

 Med/Side Effects Rev  yes

 

 DC Med Rec Rev  yes

 

 Immun Indicated  no

 

 PNE Vac  Never 

 

 Vaccines Ord Given  no

 

 Flu Vac   

 

 Tetanus Vac  Unknown 

 

 Diet Explained  yes

 

 Follow up appt  already scheduled

 

 Follow Up Appt D/T  16 @6442

## 2018-08-17 NOTE — XMS REPORT
[facility] Clinical Summary

 Created on: 2016



DanaKimberly briones JAYLAN

External Reference #: [id number]

: 1975

Sex: Female



Demographics







 Address  [address] [place]  [address]

 

 Home Phone  [email or web address]

 

 Preferred Language  English

 

 Marital Status  S

 

 Worship Affiliation  Unknown

 

 Race  Unknown

 

 Ethnic Group  Not  or 





Author







 Author  Admin, E

 

 Organization  [facility]

 

 Address  Unknown

 

 Phone  Unavailable







Allergies, Adverse Reactions, Alerts







 Allergy Name  Reaction Description  Start Date  Severity  Status  Provider

 

 No Known Allergies             Ching Vee







Conditions or Problems







 Problem Name  Problem Code  Onset Date  Status  Entry Date  Provider  Comment  
Standard Description  Annotate

 

 Asthma  493.90    Active    Jero Luz MD     Asthma, 
unspecified   (History of)

 

 Diabetes, Type 2  250.00    Active    Jero Luz MD   
  Diabetes mellitus without mention of complication, type II or unspecified type
, not stated as uncontrolled   

 

 FH Stroke  V17.1    Active    Jero Luz MD     Family 
history of stroke (cerebrovascular)   

 

 FH Diabetes  V18.0    Active    Jero Luz MD     
Family history of diabetes mellitus   

 

 Chronic tonsillitis  474.00    Active    Jero Luz MD
     Chronic tonsillitis   

 

 AFTERCARE FLW SURG TEETH ORL CAV&DIGESTV SYS NEC  V58.75    Active  
  Jero Lzu MD     Aftercare following surgery of the teeth,oral 
cavity and digestive system, NEC   

 

 Irregular menses  626.4    Resolved    Stephanie Dove MD   
  Irregular menstrual cycle   

 

 Stress incontinence  788.32    Active    Stephanie Dove MD 
    Stress incontinence, male   

 

 Hx of endometrial ablation  V45.89    Resolved    Stephanie Dove MD     Other postsurgical status   

 

 Family history of uterine cancer  V16.49    Active    Stephanie Dove MD     Family history of malignant neoplasm of other genital organ   

 

 Post-op care  V67.00    Active    Stephanie Dove MD     
Follow-up examination following surgery, unspecified   









 Irregular menses  ICD-626.4    Inactive  Stephanie Dove MD     

 

 Hx of endometrial ablation  ICD-V45.89    Inactive  Stephanie Dove MD     







Medication List







 Medication  Instructions  Start Date  Stop Date  Generic Name  NDC  Status  
Provider  Patient Instruction









 LANTUS 100 UNIT/ML SC SOLN  15units at night       INSULIN GLARGINE  
52588408521  Active  Stephanie Dove MD     Active

 

 NOVOLOG 100 UNIT/ML SC SOLN  sliding scale at meals       INSULIN 
ASPART  [id number]  Active  Stephanie Dove MD     Active

 

 GABAPENTIN 300 MG CAPS  2 po q hs for nerve pain    
GABAPENTIN  [id number]  No Longer Active  Stephanie Dove MD     Active

 

 INVOKANA 300 MG ORAL TABS  take 1 tablet by mouth daily before the first meal 
of the day    CANAGLIFLOZIN  [id number]  No Longer 
Active  Stephanie Dove MD     Active

 

 SAVELLA 100 MG ORAL TABS  1 tablet twice daily       MILNACIPRAN HCL
  [id number]  Active  Stephanie Dove MD     Active

 

 AMITRIPTYLINE HCL 25 MG ORAL TABS  1.5 tablets once daily       
AMITRIPTYLINE HCL  23748108968  Active  Stephanie Dove MD     Active

 

 TIZANIDINE HCL 4 MG ORAL TABS  1/2 tablet 2 to 3x daily       
TIZANIDINE HCL  37290622819  Active  Stephanie Dove MD     Active

 

 PANTOPRAZOLE SODIUM 40 MG ORAL TBEC  1 tablet daily       
PANTOPRAZOLE SODIUM  29686592371  Active  Stephanie Doev MD     Active

 

 RANITIDINE  MG ORAL TABS  1 tablet twice daily       
RANITIDINE HCL  09239497500  Active  Stephanie Dove MD     Active

 

 CELECOXIB 200 MG ORAL CAPS  1 tablet twice daily       CELECOXIB  
[id number]  Active  Stephanie Dove MD     Active

 

 LEVO-T 25 MCG ORAL TABS  1 tablet daily       LEVOTHYROXINE SODIUM  
51923717294  Active  Stephanie Dove MD     Active

 

 B-12 1000 MCG ORAL CAPS  1 capsule daily       CYANOCOBALAMIN  
71356164313  Active  Stephanie Dove MD     Active

 

 FENOFIBRATE 150 MG ORAL CAPS  1 tablet daily       FENOFIBRATE  
29075509061  Active  Stephanie Dove MD     Active

 

 GLYBURIDE MICRONIZED 3 MG TABS  Take one by mouth daily    GLYBURIDE MICRONIZED  [id number]  No Longer Active  Stephanie Dove MD     
Active

 

 ALPRAZOLAM TABS  Take one by mouth at bedtime as needed    ALPRAZOLAM TABS  62358688944  No Longer Active  Stephanie Dove MD     Active

 

 ALBUTEROL SULFATE 0.083 % [facility] SOLN  one vial per nebulizer needed  
     ALBUTEROL SULFATE  [id number]  Active  Jero Luz MD     Active









 ALPRAZOLAM TABS  Take one by mouth at bedtime as needed    ALPRAZOLAM TABS     ALPRAZOLAM TABS  Inactive

 

 GLYBURIDE MICRONIZED 3 MG TABS  Take one by mouth daily    GLYBURIDE MICRONIZED 3 MG TABS  [id number]  GLYBURIDE MICRONIZED  Inactive

 

 INVOKANA 300 MG ORAL TABS  take 1 tablet by mouth daily before the first meal 
of the day    INVOKANA 300 MG ORAL TABS     CANAGLIFLOZIN
  Inactive

 

 GABAPENTIN 300 MG CAPS  2 po q hs for nerve pain    
GABAPENTIN 300 MG CAPS  817055  GABAPENTIN  Inactive







Vital Signs







 Date  Name  Value  Unit  Range  Description

 

   blood pressure, diastolic - 8462-4  70  mm[Hg]     BP graham

 

   blood pressure, systolic - 8480-6  134  mm[Hg]     BP sys

 

   pulse rate E&M - 8867-4  95  /min     Heart rate

 

   temperature E&M  98.0  [degF]     Body temperature

 

   weight E&M - 3141-9  218  [lb_av]     Weight Measured

 

   blood pressure, diastolic - 8462-4  80  mm[Hg]     BP graham

 

   blood pressure, systolic - 8480-6  133  mm[Hg]     BP sys

 

   pulse rate E&M - 8867-4  104  /min     Heart rate

 

   temperature E&M  98.0  [degF]     Body temperature

 

   weight E&M - 3141-9  223  [lb_av]     Weight Measured







Diagnostic Results







 Date  Name  Value  Unit  Range  Description

 

 Chart Maintenance: Outside labs entered on flowsheet - Chemistry 

 

   sodium, serum  133  mmol/L      

 

   potassium, serum  3.8  mmol/L      

 

   blood glucose  275  mg/dL      

 

   creatinine, serum  0.64  mg/dL      

 

   aspartate aminotransferase (SGOT), serum  34  U/L      

 

   alanine aminotransferase (SGPT), serum  53  U/L      

 

   alkaline phosphatase, serum  129  U/L      

 

   protein, total urine random  2+  mg/dL      

 

 Chart Maintenance: Outside labs entered on flowsheet - Hematology 

 

   leukocyte count, blood  10.7  10*3/mm3      

 

   hemoglobin, blood  15.9  g/dL      

 

   platelet count  308  10*3/mm3      

 

 Lab Report: Chlamydia/GC APTIMA/[id number] - Lab 

 

   chlamydia DNA probe  NOT DETECTED     NOT DETECTED   

 

 Lab Report: Chlamydia/GC APTIMA/[id number] - Microbiology 

 

   Neisseria gonorrhoeae DNA probe  NOT DETECTED     NOT DETECTED   







Encounters







 Code  Encounter  Date  Provider  Facility

 

 CPT-27319  Level 3 Est. Patient   15:22:48 CST  Jero Luz MD  
[facility]







Procedures







 Code  Procedure Name  Date  Entry Date  Standard Description

 

 CPT-OV  Office Visit   15:52:26 CDT     

 

 CPT-78377  Endometrial bx wo cervical dil   15:31:33 CDT  
   

 

 CPT-OV  Office Visit   14:46:14 CDT     

 

 CPT-36783  Postop F/U Visit   06:18:39 CST

## 2020-06-02 NOTE — XMS REPORT
[facility] Clinical Summary

 Created on: 2016



DanaKimberly

External Reference #: [id number]

: 1975

Sex: Female



Demographics







 Address  [address] [place]  [address]

 

 Home Phone  [phone]

 

 Preferred Language  English

 

 Marital Status  S

 

 [employer] Affiliation  Unknown

 

 Race  Unknown

 

 Ethnic Group  Not  or 





Author







 Author  Admin, E

 

 Organization  [facility]

 

 Address  Unknown

 

 Phone  Unavailable







Allergies, Adverse Reactions, Alerts







 Allergy Name  Reaction Description  Start Date  Severity  Status  Provider

 

 No Known Allergies             DEMOND Kyle 







Conditions or Problems







 Problem Name  Problem Code  Onset Date  Status  Entry Date  Provider  Comment  
Standard Description  Annotate

 

 Asthma  493.90    Active    Jero Luz MD     Asthma, 
unspecified   (History of)

 

 Diabetes, Type 2  250.00    Active    Jero Luz MD   
  Diabetes mellitus without mention of complication, type II or unspecified type
, not stated as uncontrolled   

 

 FH Stroke  V17.1    Active    Jero Luz MD     Family 
history of stroke (cerebrovascular)   

 

 FH Diabetes  V18.0    Active    Jero Luz MD     
Family history of diabetes mellitus   

 

 Chronic tonsillitis  474.00    Active    Jero Luz MD
     Chronic tonsillitis   

 

 AFTERCARE FLW SURG TEETH ORL CAV&DIGESTV SYS NEC  V58.75    Active  
  Jero Luz MD     Aftercare following surgery of the teeth,oral 
cavity and digestive system, NEC   

 

 Irregular menses  626.4    Active    Stephanie Dove MD     
Irregular menstrual cycle   

 

 Stress incontinence  788.32    Active    Stephanie Dove MD 
    Stress incontinence, male   

 

 Hx of endometrial ablation  V45.89    Active    Stephanie Dove MD     Other postsurgical status   

 

 Family history of uterine cancer  V16.49    Active    Stephanie Dove MD     Family history of malignant neoplasm of other genital organ   







Medication List







 Medication  Instructions  Start Date  Stop Date  Generic Name  NDC  Status  
Provider  Patient Instruction









 SAVELLA 100 MG ORAL TABS  1 tablet twice daily       MILNACIPRAN HCL
  [id number]  Active  Stephanie Dove MD     Active

 

 AMITRIPTYLINE HCL 25 MG ORAL TABS  1.5 tablets once daily       
AMITRIPTYLINE HCL  33120291754  Active  Stephanie Dove MD     Active

 

 TIZANIDINE HCL 4 MG ORAL TABS  1/2 tablet 2 to 3x daily       
TIZANIDINE HCL  [id number]  Active  Stephanie Dove MD     Active

 

 PANTOPRAZOLE SODIUM 40 MG ORAL TBEC  1 tablet daily       
PANTOPRAZOLE SODIUM  [id number]  Active  Stepahnie Dove MD     Active

 

 RANITIDINE  MG ORAL TABS  1 tablet twice daily       
RANITIDINE HCL  68351521958  Active  Stephanie Dove MD     Active

 

 CELECOXIB 200 MG ORAL CAPS  1 tablet twice daily       CELECOXIB  
11632696872  Active  Stephanie Dove MD     Active

 

 LEVO-T 25 MCG ORAL TABS  1 tablet daily       LEVOTHYROXINE SODIUM  
05146407699  Active  Stephanie Dove MD     Active

 

 B-12 1000 MCG ORAL CAPS  1 capsule daily       CYANOCOBALAMIN  
11777567824  Active  Stephanie Dove MD     Active

 

 INVOKANA 300 MG ORAL TABS  take 1 tablet by mouth daily before the first meal 
of the day       CANAGLIFLOZIN  37241296422  Active  Stephanie Dove MD
     Active

 

 FENOFIBRATE 150 MG ORAL CAPS  1 tablet daily       FENOFIBRATE  
50075086745  Active  Stephanie Dove MD     Active

 

 GLYBURIDE MICRONIZED 3 MG TABS  Take one by mouth daily    GLYBURIDE MICRONIZED  18375328919  No Longer Active  Stephanie Dove MD     
Active

 

 ALPRAZOLAM TABS  Take one by mouth at bedtime as needed    ALPRAZOLAM TABS  67232236954  No Longer Active  Stephanie Dove MD     Active

 

 ALBUTEROL SULFATE 0.083 % NEBU SOLN  one vial per nebulizer needed  
     ALBUTEROL SULFATE  [id number]  Active  Jero Luz MD     Active

 

 GABAPENTIN 300 MG CAPS  2 po q hs for nerve pain       GABAPENTIN  
24350645899  Active  Jero Luz MD     Active









 ALPRAZOLAM TABS  Take one by mouth at bedtime as needed    ALPRAZOLAM TABS     ALPRAZOLAM TABS  Inactive

 

 GLYBURIDE MICRONIZED 3 MG TABS  Take one by mouth daily    GLYBURIDE MICRONIZED 3 MG TABS  717654  GLYBURIDE MICRONIZED  Inactive







Encounters







 Code  Encounter  Date  Provider  Facility

 

 CPT-24315  Level 3 Est. Patient   15:22:48 CST  Jero Luz MD  
[facility]







Procedures







 Code  Procedure Name  Date  Entry Date  Standard Description

 

 CPT-77961  Endometrial bx wo cervical dil   15:31:33 CDT  
   

 

 CPT-OV  Office Visit   14:46:14 CDT     

 

 CPT-78585  Postop F/U Visit   06:18:39 CST   2+ B/L